# Patient Record
Sex: MALE | Race: WHITE | NOT HISPANIC OR LATINO | Employment: FULL TIME | URBAN - NONMETROPOLITAN AREA
[De-identification: names, ages, dates, MRNs, and addresses within clinical notes are randomized per-mention and may not be internally consistent; named-entity substitution may affect disease eponyms.]

---

## 2017-09-08 ENCOUNTER — HOSPITAL ENCOUNTER (EMERGENCY)
Facility: HOSPITAL | Age: 22
Discharge: HOME | End: 2017-09-09
Attending: FAMILY MEDICINE | Admitting: EMERGENCY MEDICINE

## 2017-09-08 ENCOUNTER — APPOINTMENT (OUTPATIENT)
Dept: GENERAL RADIOLOGY | Facility: HOSPITAL | Age: 22
End: 2017-09-08

## 2017-09-08 DIAGNOSIS — R07.9 CHEST PAIN, UNSPECIFIED TYPE: Primary | ICD-10-CM

## 2017-09-08 LAB
ALBUMIN SERPL-MCNC: 4.6 G/DL (ref 3.5–5)
ALBUMIN/GLOB SERPL: 1.5 G/DL (ref 1.1–2.5)
ALP SERPL-CCNC: 113 U/L (ref 24–120)
ALT SERPL W P-5'-P-CCNC: 54 U/L (ref 0–54)
AMYLASE SERPL-CCNC: 66 U/L (ref 30–110)
ANION GAP SERPL CALCULATED.3IONS-SCNC: 11 MMOL/L (ref 4–13)
AST SERPL-CCNC: 44 U/L (ref 7–45)
BASOPHILS # BLD AUTO: 0.01 10*3/MM3 (ref 0–0.2)
BASOPHILS NFR BLD AUTO: 0.1 % (ref 0–2)
BILIRUB SERPL-MCNC: 1.4 MG/DL (ref 0.1–1)
BILIRUB UR QL STRIP: NEGATIVE
BUN BLD-MCNC: 18 MG/DL (ref 5–21)
BUN/CREAT SERPL: 18 (ref 7–25)
CALCIUM SPEC-SCNC: 9.2 MG/DL (ref 8.4–10.4)
CHLORIDE SERPL-SCNC: 103 MMOL/L (ref 98–110)
CLARITY UR: CLEAR
CO2 SERPL-SCNC: 26 MMOL/L (ref 24–31)
COLOR UR: YELLOW
CREAT BLD-MCNC: 1 MG/DL (ref 0.5–1.4)
DEPRECATED RDW RBC AUTO: 38.8 FL (ref 40–54)
EOSINOPHIL # BLD AUTO: 0.08 10*3/MM3 (ref 0–0.7)
EOSINOPHIL NFR BLD AUTO: 1 % (ref 0–4)
ERYTHROCYTE [DISTWIDTH] IN BLOOD BY AUTOMATED COUNT: 12.4 % (ref 12–15)
GFR SERPL CREATININE-BSD FRML MDRD: 94 ML/MIN/1.73
GLOBULIN UR ELPH-MCNC: 3 GM/DL
GLUCOSE BLD-MCNC: 104 MG/DL (ref 70–100)
GLUCOSE UR STRIP-MCNC: NEGATIVE MG/DL
HCT VFR BLD AUTO: 40.3 % (ref 40–52)
HGB BLD-MCNC: 14.2 G/DL (ref 14–18)
HGB UR QL STRIP.AUTO: NEGATIVE
HOLD SPECIMEN: NORMAL
HOLD SPECIMEN: NORMAL
IMM GRANULOCYTES # BLD: 0.01 10*3/MM3 (ref 0–0.03)
IMM GRANULOCYTES NFR BLD: 0.1 % (ref 0–5)
KETONES UR QL STRIP: NEGATIVE
LEUKOCYTE ESTERASE UR QL STRIP.AUTO: NEGATIVE
LIPASE SERPL-CCNC: 41 U/L (ref 23–203)
LYMPHOCYTES # BLD AUTO: 2.43 10*3/MM3 (ref 0.72–4.86)
LYMPHOCYTES NFR BLD AUTO: 31.2 % (ref 15–45)
MCH RBC QN AUTO: 30.4 PG (ref 28–32)
MCHC RBC AUTO-ENTMCNC: 35.2 G/DL (ref 33–36)
MCV RBC AUTO: 86.3 FL (ref 82–95)
MONOCYTES # BLD AUTO: 1.27 10*3/MM3 (ref 0.19–1.3)
MONOCYTES NFR BLD AUTO: 16.3 % (ref 4–12)
NEUTROPHILS # BLD AUTO: 4 10*3/MM3 (ref 1.87–8.4)
NEUTROPHILS NFR BLD AUTO: 51.3 % (ref 39–78)
NITRITE UR QL STRIP: NEGATIVE
PH UR STRIP.AUTO: 7.5 [PH] (ref 5–8)
PLATELET # BLD AUTO: 213 10*3/MM3 (ref 130–400)
PMV BLD AUTO: 10.5 FL (ref 6–12)
POTASSIUM BLD-SCNC: 3.9 MMOL/L (ref 3.5–5.3)
PROT SERPL-MCNC: 7.6 G/DL (ref 6.3–8.7)
PROT UR QL STRIP: NEGATIVE
RBC # BLD AUTO: 4.67 10*6/MM3 (ref 4.8–5.9)
SODIUM BLD-SCNC: 140 MMOL/L (ref 135–145)
SP GR UR STRIP: 1.01 (ref 1–1.03)
TROPONIN I SERPL-MCNC: <0.012 NG/ML (ref 0–0.03)
UROBILINOGEN UR QL STRIP: NORMAL
WBC NRBC COR # BLD: 7.8 10*3/MM3 (ref 4.8–10.8)
WHOLE BLOOD HOLD SPECIMEN: NORMAL
WHOLE BLOOD HOLD SPECIMEN: NORMAL

## 2017-09-08 PROCEDURE — 80053 COMPREHEN METABOLIC PANEL: CPT | Performed by: FAMILY MEDICINE

## 2017-09-08 PROCEDURE — 83690 ASSAY OF LIPASE: CPT | Performed by: FAMILY MEDICINE

## 2017-09-08 PROCEDURE — 85025 COMPLETE CBC W/AUTO DIFF WBC: CPT | Performed by: FAMILY MEDICINE

## 2017-09-08 PROCEDURE — 93005 ELECTROCARDIOGRAM TRACING: CPT

## 2017-09-08 PROCEDURE — 84484 ASSAY OF TROPONIN QUANT: CPT | Performed by: FAMILY MEDICINE

## 2017-09-08 PROCEDURE — 99284 EMERGENCY DEPT VISIT MOD MDM: CPT

## 2017-09-08 PROCEDURE — 81003 URINALYSIS AUTO W/O SCOPE: CPT | Performed by: FAMILY MEDICINE

## 2017-09-08 PROCEDURE — 82150 ASSAY OF AMYLASE: CPT | Performed by: FAMILY MEDICINE

## 2017-09-08 PROCEDURE — 93010 ELECTROCARDIOGRAM REPORT: CPT | Performed by: INTERNAL MEDICINE

## 2017-09-08 PROCEDURE — 71010 HC CHEST PA OR AP: CPT

## 2017-09-08 RX ORDER — ATORVASTATIN CALCIUM 20 MG/1
20 TABLET, FILM COATED ORAL DAILY
COMMUNITY
End: 2019-10-12

## 2017-09-08 RX ORDER — SODIUM CHLORIDE 0.9 % (FLUSH) 0.9 %
10 SYRINGE (ML) INJECTION AS NEEDED
Status: DISCONTINUED | OUTPATIENT
Start: 2017-09-08 | End: 2017-09-09 | Stop reason: HOSPADM

## 2017-09-08 RX ORDER — ALUMINA, MAGNESIA, AND SIMETHICONE 2400; 2400; 240 MG/30ML; MG/30ML; MG/30ML
20 SUSPENSION ORAL ONCE
Status: COMPLETED | OUTPATIENT
Start: 2017-09-08 | End: 2017-09-08

## 2017-09-08 RX ADMIN — ALUMINUM HYDROXIDE, MAGNESIUM HYDROXIDE, AND DIMETHICONE 20 ML: 400; 400; 40 SUSPENSION ORAL at 23:10

## 2017-09-08 RX ADMIN — LIDOCAINE HYDROCHLORIDE 15 ML: 20 SOLUTION ORAL; TOPICAL at 23:11

## 2017-09-09 VITALS
HEART RATE: 79 BPM | DIASTOLIC BLOOD PRESSURE: 71 MMHG | TEMPERATURE: 97.8 F | WEIGHT: 185 LBS | RESPIRATION RATE: 16 BRPM | SYSTOLIC BLOOD PRESSURE: 141 MMHG | OXYGEN SATURATION: 98 % | HEIGHT: 70 IN | BODY MASS INDEX: 26.48 KG/M2

## 2017-09-09 LAB — TROPONIN I SERPL-MCNC: <0.012 NG/ML (ref 0–0.03)

## 2017-09-09 PROCEDURE — 93010 ELECTROCARDIOGRAM REPORT: CPT | Performed by: INTERNAL MEDICINE

## 2017-09-09 PROCEDURE — 93005 ELECTROCARDIOGRAM TRACING: CPT | Performed by: FAMILY MEDICINE

## 2017-09-09 PROCEDURE — 84484 ASSAY OF TROPONIN QUANT: CPT | Performed by: FAMILY MEDICINE

## 2017-09-09 NOTE — ED NOTES
After detailed reevaluation and lengthy discussion md with pt and family, new orders received. Pt and family updated     Demi Rich RN  09/08/17 1785

## 2017-09-09 NOTE — ED NOTES
Pt to triage window demanding aspirin, informed pt I could not give him any meds until he was seen by the md Fide Gaston, RN  09/08/17 2052

## 2017-09-09 NOTE — DISCHARGE INSTRUCTIONS

## 2017-09-09 NOTE — ED PROVIDER NOTES
Subjective   HPI Comments: Patient presents today for chest pain.  Patient reports that approximately 45 minutes ago he began to have chest pain in the center of his chest that radiated up into his left shoulder and down his left arm.  He had no treatment prior to arrival.  Nothing made this worse nothing made this better.  Patient's last meal was approximately hour prior to this he had a steak and baked potato.      History provided by:  Patient   used: No        Review of Systems   Constitutional: Negative for activity change, chills, fatigue and fever.   HENT: Negative for congestion and dental problem.    Eyes: Negative for pain, discharge and itching.   Respiratory: Negative for apnea, cough, choking, chest tightness, shortness of breath, wheezing and stridor.    Cardiovascular: Positive for chest pain. Negative for palpitations.   Gastrointestinal: Negative for abdominal pain, diarrhea, nausea and vomiting.   Endocrine: Negative for polydipsia and polyuria.   Genitourinary: Negative for difficulty urinating and dysuria.   Musculoskeletal: Negative for arthralgias, neck pain and neck stiffness.   Skin: Negative for color change, pallor, rash and wound.   Neurological: Negative for dizziness and headaches.   Hematological: Negative for adenopathy. Does not bruise/bleed easily.   Psychiatric/Behavioral: Negative for agitation and behavioral problems.       Past Medical History:   Diagnosis Date   • Epistaxis    • High cholesterol    • URI (upper respiratory infection)        No Known Allergies    History reviewed. No pertinent surgical history.    History reviewed. No pertinent family history.    Social History     Social History   • Marital status: Single     Spouse name: N/A   • Number of children: N/A   • Years of education: N/A     Social History Main Topics   • Smoking status: Never Smoker   • Smokeless tobacco: None   • Alcohol use Yes   • Drug use: None   • Sexual activity: Not Asked  "    Other Topics Concern   • None     Social History Narrative   • None       Lab Results (last 24 hours)     ** No results found for the last 24 hours. **          Objective   Physical Exam   Constitutional: He is oriented to person, place, and time. He appears well-developed and well-nourished. No distress.   HENT:   Head: Normocephalic and atraumatic.   Eyes: Conjunctivae and EOM are normal. Pupils are equal, round, and reactive to light. Right eye exhibits no discharge. Left eye exhibits no discharge. No scleral icterus.   Neck: Normal range of motion. Neck supple. No JVD present. No tracheal deviation present. No thyromegaly present.   Cardiovascular: Normal rate and regular rhythm.  Exam reveals no gallop and no friction rub.    No murmur heard.  Pulmonary/Chest: Effort normal and breath sounds normal. No respiratory distress. He has no wheezes. He has no rales. He exhibits no tenderness.   Abdominal: Soft. Bowel sounds are normal. He exhibits no distension.   Musculoskeletal: Normal range of motion. He exhibits no edema, tenderness or deformity.   Neurological: He is alert and oriented to person, place, and time. No cranial nerve deficit. Coordination normal.   Skin: Skin is warm and dry. He is not diaphoretic.   Nursing note and vitals reviewed.      Procedures         XR Chest 1 View    (Results Pending)       /92 (BP Location: Right arm, Patient Position: Sitting)  Pulse 72  Temp 97.8 °F (36.6 °C) (Temporal Artery )   Resp 16  Ht 70\" (177.8 cm)  Wt 185 lb (83.9 kg)  SpO2 100%  BMI 26.54 kg/m2    ED Course    ED Course   Comment By Time   Went in and discussed with patient's mom who is just arrived.  The patient's mother got the patient to admit that he was actually still having chest pain at this time.  He states that it is still there it is probably 5 out of 10.  He states it is no longer radiating up into his shoulder however but it is still in fact there.  Given this we will be doing a " second set of cardiac enzymes and EKG and set of discharging the patient home as first intended.  I am also going to give the patient a GI cocktail to see if we elicit any type of relief from that.  Patient will be handed over to the care of Dr. Miles.  He will finish out the chart. Bella Guidry, DO 09/08 2258       Medications   sodium chloride 0.9 % flush 10 mL (not administered)            MDM  Number of Diagnoses or Management Options  Chest pain, unspecified type: new and requires workup     Amount and/or Complexity of Data Reviewed  Clinical lab tests: ordered and reviewed  Tests in the radiology section of CPT®: ordered and reviewed  Tests in the medicine section of CPT®: ordered and reviewed  Decide to obtain previous medical records or to obtain history from someone other than the patient: yes  Review and summarize past medical records: yes  Independent visualization of images, tracings, or specimens: yes    Risk of Complications, Morbidity, and/or Mortality  Presenting problems: moderate  Diagnostic procedures: moderate  Management options: moderate    Patient Progress  Patient progress: stable    [place] findings with the patient and his wife.  Patient still chest pain and asymptomatic at this time.  Patient will follow-up with his regular doctor.  Patient is elected to leave rather than stay for extra enzymes.  Final diagnoses:   Chest pain, unspecified type          Bella Guidry,   09/08/17 2248       Bella Guidry,   09/08/17 2252

## 2017-09-09 NOTE — ED PROVIDER NOTES
"Subjective   HPI Comments: Patient was initially seen by Dr. Guidry and was transferred to this examiner (Dr Matias) as her shift was completed  Patient is a 22-year-old male with history of hypercholesterolemia and acid reflux who reports that about 8:30 PM on Friday (09/08/2017) while driving he began having \"pressure\" substernal chest pain that briefly radiated to both shoulder areas.  He reports he had no shortness of breath, nausea, vomiting or diaphoresis.  He reports his chest pain was worsened by nothing and was relieved better by a GI cocktail given in this emergency department.  He reports his chest pain currently is a 0 (out of 10) and at its worse was an 8 (out of 10).  He reports he takes Zantac every day for his acid reflux.  He reports that the substernal chest pain that he was having felt different than his acid reflux.      History provided by:  Patient      Review of Systems   Constitutional: Negative.    HENT: Negative.    Eyes: Negative.    Respiratory: Negative.    Cardiovascular: Positive for chest pain.   Gastrointestinal:        Acid reflux   Endocrine: Negative.    Genitourinary: Negative.    Musculoskeletal: Negative.    Skin: Negative.    Allergic/Immunologic: Negative.    Neurological: Negative.    Hematological: Negative.    Psychiatric/Behavioral: Negative.    All other systems reviewed and are negative.      Past Medical History:   Diagnosis Date   • Epistaxis    • High cholesterol    • URI (upper respiratory infection)        No Known Allergies    History reviewed. No pertinent surgical history.    History reviewed. No pertinent family history.    Social History     Social History   • Marital status: Single     Spouse name: N/A   • Number of children: N/A   • Years of education: N/A     Social History Main Topics   • Smoking status: Never Smoker   • Smokeless tobacco: None   • Alcohol use Yes   • Drug use: None   • Sexual activity: Not Asked     Other Topics Concern   • None     Social " History Narrative   • None           Objective   Physical Exam   Constitutional: He is oriented to person, place, and time. He appears well-developed and well-nourished.   HENT:   Head: Normocephalic and atraumatic.   Right Ear: External ear normal.   Left Ear: External ear normal.   Nose: Nose normal.   Mouth/Throat: Oropharynx is clear and moist.   Eyes: Conjunctivae and EOM are normal. Pupils are equal, round, and reactive to light. Right eye exhibits no discharge. Left eye exhibits no discharge.   Neck: Normal range of motion. Neck supple. No tracheal deviation present. No thyromegaly present.   Cardiovascular: Normal rate, regular rhythm, normal heart sounds and intact distal pulses.    No murmur heard.  Pulmonary/Chest: Effort normal and breath sounds normal. No respiratory distress. He exhibits no tenderness.   Abdominal: Soft. He exhibits no distension. There is no tenderness.   Musculoskeletal: Normal range of motion. He exhibits no edema, tenderness or deformity.   Neurological: He is alert and oriented to person, place, and time. No cranial nerve deficit.   Skin: Skin is warm and dry. No erythema. No pallor.   Psychiatric: He has a normal mood and affect. Judgment normal.   Nursing note and vitals reviewed.      Procedures         ED Course  ED Course   Comment By Time   Went in and discussed with patient's mom who is just arrived.  The patient's mother got the patient to admit that he was actually still having chest pain at this time.  He states that it is still there it is probably 5 out of 10.  He states it is no longer radiating up into his shoulder however but it is still in fact there.  Given this we will be doing a second set of cardiac enzymes and EKG and set of discharging the patient home as first intended.  I am also going to give the patient a GI cocktail to see if we elicit any type of relief from that.  Patient will be handed over to the care of Dr. Miles.  He will finish out the chart.  Bella Guidry, DO 09/08 3299   Chest x-ray demonstrates no acute cardiopulmonary process. Louis Matias MD 09/09 0129                  MDM  Number of Diagnoses or Management Options  Chest pain, unspecified type: new and requires workup     Amount and/or Complexity of Data Reviewed  Clinical lab tests: reviewed  Tests in the radiology section of CPT®: reviewed  Discuss the patient with other providers: yes    Risk of Complications, Morbidity, and/or Mortality  Presenting problems: moderate  Diagnostic procedures: moderate  Management options: low    Patient Progress  Patient progress: stable      Final diagnoses:   Chest pain, unspecified type            Louis Matias MD  09/09/17 0131

## 2018-02-03 ENCOUNTER — HOSPITAL ENCOUNTER (EMERGENCY)
Facility: HOSPITAL | Age: 23
Discharge: HOME | End: 2018-02-03
Admitting: EMERGENCY MEDICINE

## 2018-02-03 ENCOUNTER — APPOINTMENT (OUTPATIENT)
Dept: ULTRASOUND IMAGING | Facility: HOSPITAL | Age: 23
End: 2018-02-03

## 2018-02-03 VITALS
SYSTOLIC BLOOD PRESSURE: 134 MMHG | OXYGEN SATURATION: 100 % | BODY MASS INDEX: 27.2 KG/M2 | WEIGHT: 190 LBS | RESPIRATION RATE: 16 BRPM | DIASTOLIC BLOOD PRESSURE: 73 MMHG | HEART RATE: 54 BPM | HEIGHT: 70 IN | TEMPERATURE: 97.9 F

## 2018-02-03 DIAGNOSIS — K40.90 LEFT INGUINAL HERNIA: Primary | ICD-10-CM

## 2018-02-03 DIAGNOSIS — R31.9 HEMATURIA, UNSPECIFIED TYPE: ICD-10-CM

## 2018-02-03 LAB
ALBUMIN SERPL-MCNC: 4.4 G/DL (ref 3.5–5)
ALBUMIN/GLOB SERPL: 1.4 G/DL (ref 1.1–2.5)
ALP SERPL-CCNC: 98 U/L (ref 24–120)
ALT SERPL W P-5'-P-CCNC: 44 U/L (ref 0–54)
ANION GAP SERPL CALCULATED.3IONS-SCNC: 13 MMOL/L (ref 4–13)
AST SERPL-CCNC: 30 U/L (ref 7–45)
BACTERIA UR QL AUTO: ABNORMAL /HPF
BASOPHILS # BLD AUTO: 0.04 10*3/MM3 (ref 0–0.2)
BASOPHILS NFR BLD AUTO: 0.7 % (ref 0–2)
BILIRUB SERPL-MCNC: 1 MG/DL (ref 0.1–1)
BILIRUB UR QL STRIP: NEGATIVE
BUN BLD-MCNC: 18 MG/DL (ref 5–21)
BUN/CREAT SERPL: 16.2 (ref 7–25)
CALCIUM SPEC-SCNC: 9.4 MG/DL (ref 8.4–10.4)
CHLORIDE SERPL-SCNC: 103 MMOL/L (ref 98–110)
CLARITY UR: CLEAR
CO2 SERPL-SCNC: 27 MMOL/L (ref 24–31)
COLOR UR: YELLOW
CREAT BLD-MCNC: 1.11 MG/DL (ref 0.5–1.4)
DEPRECATED RDW RBC AUTO: 36.4 FL (ref 40–54)
EOSINOPHIL # BLD AUTO: 0.06 10*3/MM3 (ref 0–0.7)
EOSINOPHIL NFR BLD AUTO: 1.1 % (ref 0–4)
ERYTHROCYTE [DISTWIDTH] IN BLOOD BY AUTOMATED COUNT: 12 % (ref 12–15)
GFR SERPL CREATININE-BSD FRML MDRD: 84 ML/MIN/1.73
GLOBULIN UR ELPH-MCNC: 3.2 GM/DL
GLUCOSE BLD-MCNC: 94 MG/DL (ref 70–100)
GLUCOSE UR STRIP-MCNC: NEGATIVE MG/DL
HCT VFR BLD AUTO: 42.8 % (ref 40–52)
HGB BLD-MCNC: 15 G/DL (ref 14–18)
HGB UR QL STRIP.AUTO: ABNORMAL
HOLD SPECIMEN: NORMAL
HOLD SPECIMEN: NORMAL
HYALINE CASTS UR QL AUTO: ABNORMAL /LPF
IMM GRANULOCYTES # BLD: 0.02 10*3/MM3 (ref 0–0.03)
IMM GRANULOCYTES NFR BLD: 0.4 % (ref 0–5)
KETONES UR QL STRIP: NEGATIVE
LEUKOCYTE ESTERASE UR QL STRIP.AUTO: NEGATIVE
LYMPHOCYTES # BLD AUTO: 1.82 10*3/MM3 (ref 0.72–4.86)
LYMPHOCYTES NFR BLD AUTO: 34 % (ref 15–45)
MCH RBC QN AUTO: 29.8 PG (ref 28–32)
MCHC RBC AUTO-ENTMCNC: 35 G/DL (ref 33–36)
MCV RBC AUTO: 84.9 FL (ref 82–95)
MONOCYTES # BLD AUTO: 0.68 10*3/MM3 (ref 0.19–1.3)
MONOCYTES NFR BLD AUTO: 12.7 % (ref 4–12)
NEUTROPHILS # BLD AUTO: 2.73 10*3/MM3 (ref 1.87–8.4)
NEUTROPHILS NFR BLD AUTO: 51.1 % (ref 39–78)
NITRITE UR QL STRIP: NEGATIVE
NRBC BLD MANUAL-RTO: 0 /100 WBC (ref 0–0)
PH UR STRIP.AUTO: 5.5 [PH] (ref 5–8)
PLATELET # BLD AUTO: 279 10*3/MM3 (ref 130–400)
PMV BLD AUTO: 9.8 FL (ref 6–12)
POTASSIUM BLD-SCNC: 4.2 MMOL/L (ref 3.5–5.3)
PROT SERPL-MCNC: 7.6 G/DL (ref 6.3–8.7)
PROT UR QL STRIP: NEGATIVE
RBC # BLD AUTO: 5.04 10*6/MM3 (ref 4.8–5.9)
RBC # UR: ABNORMAL /HPF
REF LAB TEST METHOD: ABNORMAL
SODIUM BLD-SCNC: 143 MMOL/L (ref 135–145)
SP GR UR STRIP: 1.02 (ref 1–1.03)
SQUAMOUS #/AREA URNS HPF: ABNORMAL /HPF
UROBILINOGEN UR QL STRIP: ABNORMAL
WBC NRBC COR # BLD: 5.35 10*3/MM3 (ref 4.8–10.8)
WBC UR QL AUTO: ABNORMAL /HPF
WHOLE BLOOD HOLD SPECIMEN: NORMAL
WHOLE BLOOD HOLD SPECIMEN: NORMAL

## 2018-02-03 PROCEDURE — 81001 URINALYSIS AUTO W/SCOPE: CPT | Performed by: NURSE PRACTITIONER

## 2018-02-03 PROCEDURE — 99283 EMERGENCY DEPT VISIT LOW MDM: CPT

## 2018-02-03 PROCEDURE — 85025 COMPLETE CBC W/AUTO DIFF WBC: CPT | Performed by: NURSE PRACTITIONER

## 2018-02-03 PROCEDURE — 96374 THER/PROPH/DIAG INJ IV PUSH: CPT

## 2018-02-03 PROCEDURE — 80053 COMPREHEN METABOLIC PANEL: CPT | Performed by: NURSE PRACTITIONER

## 2018-02-03 PROCEDURE — 76870 US EXAM SCROTUM: CPT

## 2018-02-03 PROCEDURE — 96375 TX/PRO/DX INJ NEW DRUG ADDON: CPT

## 2018-02-03 PROCEDURE — 25010000002 MORPHINE PER 10 MG: Performed by: NURSE PRACTITIONER

## 2018-02-03 PROCEDURE — 25010000002 ONDANSETRON PER 1 MG: Performed by: NURSE PRACTITIONER

## 2018-02-03 RX ORDER — SODIUM CHLORIDE 0.9 % (FLUSH) 0.9 %
10 SYRINGE (ML) INJECTION AS NEEDED
Status: DISCONTINUED | OUTPATIENT
Start: 2018-02-03 | End: 2018-02-03 | Stop reason: HOSPADM

## 2018-02-03 RX ORDER — HYDROCODONE BITARTRATE AND ACETAMINOPHEN 5; 325 MG/1; MG/1
1 TABLET ORAL EVERY 6 HOURS PRN
Qty: 15 TABLET | Refills: 0 | Status: SHIPPED | OUTPATIENT
Start: 2018-02-03 | End: 2018-04-10

## 2018-02-03 RX ORDER — RANITIDINE 150 MG/1
150 TABLET ORAL 2 TIMES DAILY
COMMUNITY
End: 2019-10-12

## 2018-02-03 RX ORDER — ONDANSETRON 2 MG/ML
4 INJECTION INTRAMUSCULAR; INTRAVENOUS ONCE
Status: COMPLETED | OUTPATIENT
Start: 2018-02-03 | End: 2018-02-03

## 2018-02-03 RX ORDER — MORPHINE SULFATE 4 MG/ML
4 INJECTION, SOLUTION INTRAMUSCULAR; INTRAVENOUS ONCE
Status: COMPLETED | OUTPATIENT
Start: 2018-02-03 | End: 2018-02-03

## 2018-02-03 RX ADMIN — ONDANSETRON 4 MG: 2 INJECTION, SOLUTION INTRAMUSCULAR; INTRAVENOUS at 08:35

## 2018-02-03 RX ADMIN — MORPHINE SULFATE 4 MG: 4 INJECTION, SOLUTION INTRAMUSCULAR; INTRAVENOUS at 08:36

## 2018-02-03 NOTE — ED PROVIDER NOTES
Subjective   HPI Comments: This 22-year-old white male presents with left testicular pain that began around 5 o'clock this morning.  He states the pain was sudden and walking from his sleep.  He states he has urinated without difficulty.  He denies any fever or chills.  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  He denies having any pain yesterday and denies any injury.  His family also states that he played multiple softball games last week as well the patient was not having any pain until this morning.  Denies any penile discharge or dysuria.    Patient is a 22 y.o. male presenting with male genitourinary complaint.   History provided by:  Patient   used: No    Male  Problem       Review of Systems   Constitutional: Negative.    HENT: Negative.    Eyes: Negative.    Respiratory: Negative.    Cardiovascular: Negative.    Gastrointestinal: Negative.    Endocrine: Negative.    Genitourinary:        This 22-year-old white male presents with left testicular pain that began around 5 o'clock this morning.  He states the pain was sudden and walking from his sleep.  He states he has urinated without difficulty.  He denies any fever or chills.  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  He denies having any pain yesterday and denies any injury.  His family also states that he played multiple softball games last week as well the patient was not having any pain until this morning.  Denies any penile discharge or dysuria.     Musculoskeletal: Negative.    Skin: Negative.    Allergic/Immunologic: Negative.    Neurological: Negative.    Hematological: Negative.    Psychiatric/Behavioral: Negative.    All other systems reviewed and are negative.      Past Medical History:   Diagnosis Date   • Epistaxis    • High cholesterol    • URI (upper respiratory infection)        No Known Allergies    History reviewed. No pertinent  "surgical history.    History reviewed. No pertinent family history.    Social History     Social History   • Marital status: Single     Spouse name: N/A   • Number of children: N/A   • Years of education: N/A     Social History Main Topics   • Smoking status: Never Smoker   • Smokeless tobacco: None   • Alcohol use Yes      Comment: Socially   • Drug use: No   • Sexual activity: Defer     Other Topics Concern   • None     Social History Narrative   • None       Prior to Admission medications    Medication Sig Start Date End Date Taking? Authorizing Provider   raNITIdine (ZANTAC) 150 MG tablet Take 150 mg by mouth 2 (Two) Times a Day.   Yes Historical Provider, MD   atorvastatin (LIPITOR) 20 MG tablet Take 20 mg by mouth Daily.    Historical Provider, MD       /73  Pulse 54  Temp 97.9 °F (36.6 °C)  Resp 16  Ht 177.8 cm (70\")  Wt 86.2 kg (190 lb)  SpO2 100%  BMI 27.26 kg/m2    Objective   Physical Exam   Constitutional: He is oriented to person, place, and time. He appears well-developed and well-nourished.   HENT:   Head: Normocephalic and atraumatic.   Eyes: Conjunctivae and EOM are normal. Pupils are equal, round, and reactive to light.   Neck: Normal range of motion. Neck supple.   Cardiovascular: Normal rate, regular rhythm, normal heart sounds and intact distal pulses.    Pulmonary/Chest: Effort normal and breath sounds normal.   Abdominal: Soft. Bowel sounds are normal.   Genitourinary:   Genitourinary Comments: Moderate tenderness and swelling noted to left scrotum. No eryth noted. No palpable nodule or mass noted.    Musculoskeletal: Normal range of motion.   Neurological: He is alert and oriented to person, place, and time. He has normal reflexes.   Skin: Skin is warm and dry.   Psychiatric: He has a normal mood and affect. His behavior is normal. Judgment and thought content normal.   Nursing note and vitals reviewed.      Procedures         Lab Results (last 24 hours)     Procedure Component " Value Units Date/Time    CBC & Differential [[id number]] Collected:  02/03/18 0800    Specimen:  Blood Updated:  02/03/18 0811    Narrative:       The following orders were created for panel order CBC & Differential.  Procedure                               Abnormality         Status                     ---------                               -----------         ------                     CBC Auto Differential[[id number]]        Abnormal            Final result                 Please view results for these tests on the individual orders.    Comprehensive Metabolic Panel [[id number]] Collected:  02/03/18 0800    Specimen:  Blood Updated:  02/03/18 0822     Glucose 94 mg/dL      BUN 18 mg/dL      Creatinine 1.11 mg/dL      Sodium 143 mmol/L      Potassium 4.2 mmol/L      Chloride 103 mmol/L      CO2 27.0 mmol/L      Calcium 9.4 mg/dL      Total Protein 7.6 g/dL      Albumin 4.40 g/dL      ALT (SGPT) 44 U/L      AST (SGOT) 30 U/L      Alkaline Phosphatase 98 U/L      Total Bilirubin 1.0 mg/dL      eGFR Non African Amer 84 mL/min/1.73      Globulin 3.2 gm/dL      A/G Ratio 1.4 g/dL      BUN/Creatinine Ratio 16.2     Anion Gap 13.0 mmol/L     CBC Auto Differential [[id number]]  (Abnormal) Collected:  02/03/18 0800    Specimen:  Blood Updated:  02/03/18 0811     WBC 5.35 10*3/mm3      RBC 5.04 10*6/mm3      Hemoglobin 15.0 g/dL      Hematocrit 42.8 %      MCV 84.9 fL      MCH 29.8 pg      MCHC 35.0 g/dL      RDW 12.0 %      RDW-SD 36.4 (L) fl      MPV 9.8 fL      Platelets 279 10*3/mm3      Neutrophil % 51.1 %      Lymphocyte % 34.0 %      Monocyte % 12.7 (H) %      Eosinophil % 1.1 %      Basophil % 0.7 %      Immature Grans % 0.4 %      Neutrophils, Absolute 2.73 10*3/mm3      Lymphocytes, Absolute 1.82 10*3/mm3      Monocytes, Absolute 0.68 10*3/mm3      Eosinophils, Absolute 0.06 10*3/mm3      Basophils, Absolute 0.04 10*3/mm3      Immature Grans, Absolute 0.02 10*3/mm3      nRBC 0.0 /100 WBC     Urinalysis With / Culture  If Indicated - Urine, Clean Catch [[id number]]  (Abnormal) Collected:  02/03/18 1015    Specimen:  Urine from Urine, Clean Catch Updated:  02/03/18 1029     Color, UA Yellow     Appearance, UA Clear     pH, UA 5.5     Specific Gravity, UA 1.019     Glucose, UA Negative     Ketones, UA Negative     Bilirubin, UA Negative     Blood, UA Large (3+) (A)     Protein, UA Negative     Leuk Esterase, UA Negative     Nitrite, UA Negative     Urobilinogen, UA 0.2 E.U./dL    Urinalysis, Microscopic Only - Urine, Clean Catch [[id number]]  (Abnormal) Collected:  02/03/18 1015    Specimen:  Urine from Urine, Clean Catch Updated:  02/03/18 1029     RBC, UA 13-20 (A) /HPF      WBC, UA 0-2 (A) /HPF      Bacteria, UA None Seen /HPF      Squamous Epithelial Cells, UA None Seen /HPF      Hyaline Casts, UA 3-6 /LPF      Methodology Automated Microscopy          US Scrotum & Testicles   ED Interpretation   Impression:       1. Normal appearance of the testicles without evidence of testicular   torsion.       2. Single image demonstrates soft tissue density in the region of the   left inguinal canal which cannot be reproduced. This may represent   artifact or bowel. Correlation for possible hernia recommended.   This report was finalized on 02/03/2018 09:41 by Dr. Markus Lockwood MD.      Final Result   Impression:       1. Normal appearance of the testicles without evidence of testicular   torsion.       2. Single image demonstrates soft tissue density in the region of the   left inguinal canal which cannot be reproduced. This may represent   artifact or bowel. Correlation for possible hernia recommended.   This report was finalized on 02/03/2018 09:41 by Dr. Markus Lockwood MD.          ED Course  ED Course   Comment By Time   Pending ultrasound at this time. Nano Weldon, APRN 02/03 1659   Reviewed pt and pt care plan with dr rodríguez- also in agreement with pt care plan. Reviewed results with pt and pt significant other. Have  advised to follow up with dr anderson for further. Advised to limit lifting as well. [facility] report completed #[id number]. No signs of suspicious activity noted. Will write for small amt of pain medication for acute pain. Reviewed side effects and potential for abuse. Nano Weldon, APRN 02/03 1031          MDM  Number of Diagnoses or Management Options  Hematuria, unspecified type: new and requires workup  Left inguinal hernia: new and requires workup     Amount and/or Complexity of Data Reviewed  Clinical lab tests: ordered and reviewed  Tests in the radiology section of CPT®: ordered and reviewed  Independent visualization of images, tracings, or specimens: yes        Final diagnoses:   Left inguinal hernia   Hematuria, unspecified type          Nano Weldon, APRN  02/03/18 1147

## 2018-02-03 NOTE — DISCHARGE INSTRUCTIONS
Return to ER if symptoms worsen   Ice to area  No lifting over 5 pounds until released per dr anderson; light duty only

## 2018-02-04 ENCOUNTER — APPOINTMENT (OUTPATIENT)
Dept: CT IMAGING | Facility: HOSPITAL | Age: 23
End: 2018-02-04

## 2018-02-04 ENCOUNTER — HOSPITAL ENCOUNTER (EMERGENCY)
Facility: HOSPITAL | Age: 23
Discharge: HOME | End: 2018-02-04
Admitting: NURSE PRACTITIONER

## 2018-02-04 VITALS
OXYGEN SATURATION: 100 % | SYSTOLIC BLOOD PRESSURE: 141 MMHG | RESPIRATION RATE: 18 BRPM | BODY MASS INDEX: 27.2 KG/M2 | WEIGHT: 190 LBS | TEMPERATURE: 98.1 F | HEART RATE: 53 BPM | HEIGHT: 70 IN | DIASTOLIC BLOOD PRESSURE: 68 MMHG

## 2018-02-04 DIAGNOSIS — K40.90 UNILATERAL INGUINAL HERNIA WITHOUT OBSTRUCTION OR GANGRENE, RECURRENCE NOT SPECIFIED: ICD-10-CM

## 2018-02-04 DIAGNOSIS — N20.0 KIDNEY STONE: Primary | ICD-10-CM

## 2018-02-04 LAB
ALBUMIN SERPL-MCNC: 4.6 G/DL (ref 3.5–5)
ALBUMIN/GLOB SERPL: 1.4 G/DL (ref 1.1–2.5)
ALP SERPL-CCNC: 100 U/L (ref 24–120)
ALT SERPL W P-5'-P-CCNC: 51 U/L (ref 0–54)
AMYLASE SERPL-CCNC: 66 U/L (ref 30–110)
ANION GAP SERPL CALCULATED.3IONS-SCNC: 12 MMOL/L (ref 4–13)
AST SERPL-CCNC: 32 U/L (ref 7–45)
BASOPHILS # BLD AUTO: 0.03 10*3/MM3 (ref 0–0.2)
BASOPHILS NFR BLD AUTO: 0.3 % (ref 0–2)
BILIRUB SERPL-MCNC: 1.4 MG/DL (ref 0.1–1)
BUN BLD-MCNC: 18 MG/DL (ref 5–21)
BUN/CREAT SERPL: 17.6 (ref 7–25)
CALCIUM SPEC-SCNC: 9.6 MG/DL (ref 8.4–10.4)
CHLORIDE SERPL-SCNC: 102 MMOL/L (ref 98–110)
CO2 SERPL-SCNC: 28 MMOL/L (ref 24–31)
CREAT BLD-MCNC: 1.02 MG/DL (ref 0.5–1.4)
DEPRECATED RDW RBC AUTO: 37.7 FL (ref 40–54)
EOSINOPHIL # BLD AUTO: 0.01 10*3/MM3 (ref 0–0.7)
EOSINOPHIL NFR BLD AUTO: 0.1 % (ref 0–4)
ERYTHROCYTE [DISTWIDTH] IN BLOOD BY AUTOMATED COUNT: 12.1 % (ref 12–15)
GFR SERPL CREATININE-BSD FRML MDRD: 92 ML/MIN/1.73
GLOBULIN UR ELPH-MCNC: 3.3 GM/DL
GLUCOSE BLD-MCNC: 90 MG/DL (ref 70–100)
HCT VFR BLD AUTO: 42.9 % (ref 40–52)
HGB BLD-MCNC: 14.5 G/DL (ref 14–18)
HOLD SPECIMEN: NORMAL
HOLD SPECIMEN: NORMAL
IMM GRANULOCYTES # BLD: 0.02 10*3/MM3 (ref 0–0.03)
IMM GRANULOCYTES NFR BLD: 0.2 % (ref 0–5)
LIPASE SERPL-CCNC: 29 U/L (ref 23–203)
LYMPHOCYTES # BLD AUTO: 1.24 10*3/MM3 (ref 0.72–4.86)
LYMPHOCYTES NFR BLD AUTO: 13.2 % (ref 15–45)
MCH RBC QN AUTO: 29.1 PG (ref 28–32)
MCHC RBC AUTO-ENTMCNC: 33.8 G/DL (ref 33–36)
MCV RBC AUTO: 86 FL (ref 82–95)
MONOCYTES # BLD AUTO: 0.63 10*3/MM3 (ref 0.19–1.3)
MONOCYTES NFR BLD AUTO: 6.7 % (ref 4–12)
NEUTROPHILS # BLD AUTO: 7.43 10*3/MM3 (ref 1.87–8.4)
NEUTROPHILS NFR BLD AUTO: 79.5 % (ref 39–78)
NRBC BLD MANUAL-RTO: 0 /100 WBC (ref 0–0)
PLATELET # BLD AUTO: 284 10*3/MM3 (ref 130–400)
PMV BLD AUTO: 10.3 FL (ref 6–12)
POTASSIUM BLD-SCNC: 4.3 MMOL/L (ref 3.5–5.3)
PROT SERPL-MCNC: 7.9 G/DL (ref 6.3–8.7)
RBC # BLD AUTO: 4.99 10*6/MM3 (ref 4.8–5.9)
SODIUM BLD-SCNC: 142 MMOL/L (ref 135–145)
WBC NRBC COR # BLD: 9.36 10*3/MM3 (ref 4.8–10.8)
WHOLE BLOOD HOLD SPECIMEN: NORMAL
WHOLE BLOOD HOLD SPECIMEN: NORMAL

## 2018-02-04 PROCEDURE — 85025 COMPLETE CBC W/AUTO DIFF WBC: CPT | Performed by: NURSE PRACTITIONER

## 2018-02-04 PROCEDURE — 25010000002 KETOROLAC TROMETHAMINE PER 15 MG: Performed by: NURSE PRACTITIONER

## 2018-02-04 PROCEDURE — 80053 COMPREHEN METABOLIC PANEL: CPT | Performed by: NURSE PRACTITIONER

## 2018-02-04 PROCEDURE — 0 IOPAMIDOL 61 % SOLUTION: Performed by: NURSE PRACTITIONER

## 2018-02-04 PROCEDURE — 96375 TX/PRO/DX INJ NEW DRUG ADDON: CPT

## 2018-02-04 PROCEDURE — 99283 EMERGENCY DEPT VISIT LOW MDM: CPT

## 2018-02-04 PROCEDURE — 83690 ASSAY OF LIPASE: CPT | Performed by: NURSE PRACTITIONER

## 2018-02-04 PROCEDURE — 25010000002 HYDROMORPHONE PER 4 MG: Performed by: NURSE PRACTITIONER

## 2018-02-04 PROCEDURE — 74177 CT ABD & PELVIS W/CONTRAST: CPT

## 2018-02-04 PROCEDURE — 96361 HYDRATE IV INFUSION ADD-ON: CPT

## 2018-02-04 PROCEDURE — 96374 THER/PROPH/DIAG INJ IV PUSH: CPT

## 2018-02-04 PROCEDURE — 82150 ASSAY OF AMYLASE: CPT | Performed by: NURSE PRACTITIONER

## 2018-02-04 PROCEDURE — 25010000002 ONDANSETRON PER 1 MG: Performed by: NURSE PRACTITIONER

## 2018-02-04 RX ORDER — TAMSULOSIN HYDROCHLORIDE 0.4 MG/1
1 CAPSULE ORAL DAILY
Qty: 30 CAPSULE | Refills: 0 | Status: SHIPPED | OUTPATIENT
Start: 2018-02-04 | End: 2019-10-12

## 2018-02-04 RX ORDER — KETOROLAC TROMETHAMINE 30 MG/ML
30 INJECTION, SOLUTION INTRAMUSCULAR; INTRAVENOUS ONCE
Status: COMPLETED | OUTPATIENT
Start: 2018-02-04 | End: 2018-02-04

## 2018-02-04 RX ORDER — ONDANSETRON 2 MG/ML
4 INJECTION INTRAMUSCULAR; INTRAVENOUS ONCE
Status: COMPLETED | OUTPATIENT
Start: 2018-02-04 | End: 2018-02-04

## 2018-02-04 RX ORDER — OXYCODONE AND ACETAMINOPHEN 7.5; 325 MG/1; MG/1
1 TABLET ORAL EVERY 4 HOURS PRN
Qty: 15 TABLET | Refills: 0 | Status: SHIPPED | OUTPATIENT
Start: 2018-02-04 | End: 2018-04-10

## 2018-02-04 RX ORDER — SODIUM CHLORIDE 9 MG/ML
125 INJECTION, SOLUTION INTRAVENOUS CONTINUOUS
Status: DISCONTINUED | OUTPATIENT
Start: 2018-02-04 | End: 2018-02-04 | Stop reason: HOSPADM

## 2018-02-04 RX ORDER — ONDANSETRON 4 MG/1
4 TABLET, ORALLY DISINTEGRATING ORAL EVERY 6 HOURS PRN
Qty: 10 TABLET | Refills: 0 | Status: SHIPPED | OUTPATIENT
Start: 2018-02-04 | End: 2018-04-10

## 2018-02-04 RX ADMIN — KETOROLAC TROMETHAMINE 30 MG: 30 INJECTION, SOLUTION INTRAMUSCULAR at 15:17

## 2018-02-04 RX ADMIN — SODIUM CHLORIDE 125 ML/HR: 9 INJECTION, SOLUTION INTRAVENOUS at 14:17

## 2018-02-04 RX ADMIN — IOPAMIDOL 100 ML: 612 INJECTION, SOLUTION INTRAVENOUS at 14:43

## 2018-02-04 RX ADMIN — SODIUM CHLORIDE 1000 ML: 9 INJECTION, SOLUTION INTRAVENOUS at 15:21

## 2018-02-04 RX ADMIN — HYDROMORPHONE HYDROCHLORIDE 1 MG: 1 INJECTION, SOLUTION INTRAMUSCULAR; INTRAVENOUS; SUBCUTANEOUS at 14:21

## 2018-02-04 RX ADMIN — ONDANSETRON 4 MG: 2 INJECTION, SOLUTION INTRAMUSCULAR; INTRAVENOUS at 14:22

## 2018-02-04 NOTE — ED PROVIDER NOTES
Subjective   HPI Comments: Patient is a 22-year-old white male presents with left groin pain and left lower quadrant pain which has been going on for the past several days.  Patient was seen here yesterday with left testicular pain and had ultrasound and labs completed through the emergency department which indicated that he had a possible left inguinal hernia.  He was written a prescription for hydrocodone and advised that he would need to follow up with general surgery.  He states today that the pain is worse and that he has not had a bowel movement today as well.  He is complaining of nausea without vomiting.  He denies any fever or chills.    Patient is a 22 y.o. male presenting with abdominal pain.   History provided by:  Patient   used: No    Abdominal Pain       Review of Systems   Constitutional: Negative.    HENT: Negative.    Eyes: Negative.    Respiratory: Negative.    Cardiovascular: Negative.    Gastrointestinal: Positive for abdominal pain.        Patient is a 22-year-old white male presents with left groin pain and left lower quadrant pain which has been going on for the past several days.  Patient was seen here yesterday with left testicular pain and had ultrasound and labs completed through the emergency department which indicated that he had a possible left inguinal hernia.  He was written a prescription for hydrocodone and advised that he would need to follow up with general surgery.  He states today that the pain is worse and that he has not had a bowel movement today as well. He states that his pain is not relieved with hydrocodone. He states that occasionally he is having pain in the left side of his back as well.  He is complaining of nausea without vomiting.  He denies any fever or chills.     Endocrine: Negative.    Genitourinary: Negative.    Musculoskeletal: Negative.    Skin: Negative.    Allergic/Immunologic: Negative.    Neurological: Negative.    Hematological:  "Negative.    Psychiatric/Behavioral: Negative.    All other systems reviewed and are negative.      Past Medical History:   Diagnosis Date   • Epistaxis    • High cholesterol    • URI (upper respiratory infection)        No Known Allergies    History reviewed. No pertinent surgical history.    History reviewed. No pertinent family history.    Social History     Social History   • Marital status: Single     Spouse name: N/A   • Number of children: N/A   • Years of education: N/A     Social History Main Topics   • Smoking status: Never Smoker   • Smokeless tobacco: Never Used   • Alcohol use Yes      Comment: Socially   • Drug use: No   • Sexual activity: Defer     Other Topics Concern   • None     Social History Narrative   • None       Prior to Admission medications    Medication Sig Start Date End Date Taking? Authorizing Provider   atorvastatin (LIPITOR) 20 MG tablet Take 20 mg by mouth Daily.   Yes Historical Provider, MD   HYDROcodone-acetaminophen (NORCO) 5-325 MG per tablet Take 1 tablet by mouth Every 6 (Six) Hours As Needed for Moderate Pain . 2/3/18  Yes FRANCISCO Ramirez   raNITIdine (ZANTAC) 150 MG tablet Take 150 mg by mouth 2 (Two) Times a Day.   Yes Historical Provider, MD       /68  Pulse 53  Temp 98.1 °F (36.7 °C) (Temporal Artery )   Resp 18  Ht 177.8 cm (70\")  Wt 86.2 kg (190 lb)  SpO2 100%  BMI 27.26 kg/m2    Objective   Physical Exam   Constitutional: He is oriented to person, place, and time. He appears well-developed and well-nourished.   HENT:   Head: Normocephalic and atraumatic.   Eyes: Conjunctivae and EOM are normal. Pupils are equal, round, and reactive to light.   Neck: Normal range of motion. Neck supple.   Cardiovascular: Normal rate, regular rhythm, normal heart sounds and intact distal pulses.    Pulmonary/Chest: Effort normal and breath sounds normal.   Abdominal: Soft. Bowel sounds are normal.   Moderate tenderness noted to left inguinal area. Mild soft tissue " tenderness noted. abd soft, nondistended. bs x 4 quads.    Musculoskeletal: Normal range of motion.   Neurological: He is alert and oriented to person, place, and time. He has normal reflexes.   Skin: Skin is warm and dry.   Psychiatric: He has a normal mood and affect. His behavior is normal. Judgment and thought content normal.   Nursing note and vitals reviewed.      Procedures         Lab Results (last 24 hours)     Procedure Component Value Units Date/Time    CBC & Differential [[id number]] Collected:  02/04/18 1416    Specimen:  Blood Updated:  02/04/18 1434    Narrative:       The following orders were created for panel order CBC & Differential.  Procedure                               Abnormality         Status                     ---------                               -----------         ------                     CBC Auto Differential[[id number]]        Abnormal            Final result                 Please view results for these tests on the individual orders.    Comprehensive Metabolic Panel [[id number]]  (Abnormal) Collected:  02/04/18 1416    Specimen:  Blood Updated:  02/04/18 1445     Glucose 90 mg/dL      BUN 18 mg/dL      Creatinine 1.02 mg/dL      Sodium 142 mmol/L      Potassium 4.3 mmol/L      Chloride 102 mmol/L      CO2 28.0 mmol/L      Calcium 9.6 mg/dL      Total Protein 7.9 g/dL      Albumin 4.60 g/dL      ALT (SGPT) 51 U/L      AST (SGOT) 32 U/L      Alkaline Phosphatase 100 U/L      Total Bilirubin 1.4 (H) mg/dL      eGFR Non African Amer 92 mL/min/1.73      Globulin 3.3 gm/dL      A/G Ratio 1.4 g/dL      BUN/Creatinine Ratio 17.6     Anion Gap 12.0 mmol/L     Lipase [[id number]]  (Normal) Collected:  02/04/18 1416    Specimen:  Blood Updated:  02/04/18 1445     Lipase 29 U/L     Amylase [[id number]]  (Normal) Collected:  02/04/18 1416    Specimen:  Blood Updated:  02/04/18 1445     Amylase 66 U/L     CBC Auto Differential [[id number]]  (Abnormal) Collected:  02/04/18 1416    Specimen:  Blood  Updated:  02/04/18 1434     WBC 9.36 10*3/mm3      RBC 4.99 10*6/mm3      Hemoglobin 14.5 g/dL      Hematocrit 42.9 %      MCV 86.0 fL      MCH 29.1 pg      MCHC 33.8 g/dL      RDW 12.1 %      RDW-SD 37.7 (L) fl      MPV 10.3 fL      Platelets 284 10*3/mm3      Neutrophil % 79.5 (H) %      Lymphocyte % 13.2 (L) %      Monocyte % 6.7 %      Eosinophil % 0.1 %      Basophil % 0.3 %      Immature Grans % 0.2 %      Neutrophils, Absolute 7.43 10*3/mm3      Lymphocytes, Absolute 1.24 10*3/mm3      Monocytes, Absolute 0.63 10*3/mm3      Eosinophils, Absolute 0.01 10*3/mm3      Basophils, Absolute 0.03 10*3/mm3      Immature Grans, Absolute 0.02 10*3/mm3      nRBC 0.0 /100 WBC           CT Abdomen Pelvis With Contrast   ED Interpretation   1. Distal nonobstructive left ureteral stone measuring approximately 4   mm in size, approximately 4 mm from the right UVJ.   2. Hepatic steatosis.               This report was finalized on 02/04/2018 14:54 by Dr. Markus Lockwood MD.      Final Result   1. Distal nonobstructive left ureteral stone measuring approximately 4   mm in size, approximately 4 mm from the right UVJ.   2. Hepatic steatosis.               This report was finalized on 02/04/2018 14:54 by Dr. Markus Lockwood MD.          ED Course  ED Course   Comment By Time   Reviewed results with pt and pt spouse. Pt states that pain is better, but is still about 2 on scale of 1-10. Advised that he would need to follow up with urology and general surgery as well. Maxx report completed #[id number]. No signs of suspicious activity noted. Will write for small amt of pain medication for acute pain. Reviewed side effects and potential for abuse. Advised to increase water intake. Advised to return before if symptoms worsen, increased pain, inability to urinate. Nano Weldon, APRN 02/04 1096          MDM  Number of Diagnoses or Management Options  Kidney stone: new and requires workup  Unilateral inguinal hernia without  obstruction or gangrene, recurrence not specified: new and requires workup     Amount and/or Complexity of Data Reviewed  Clinical lab tests: ordered and reviewed  Tests in the radiology section of CPT®: ordered and reviewed  Independent visualization of images, tracings, or specimens: yes    Patient Progress  Patient progress: stable      Final diagnoses:   Kidney stone   Unilateral inguinal hernia without obstruction or gangrene, recurrence not specified          Nano Weldon, APRN  02/04/18 7471

## 2018-02-04 NOTE — ED NOTES
C/o's 'left testicle pain, started yesterday morning at 5am, was here yesterday morning, went home with pain meds, they're not really helping, since the Morphine wore off'     Mile Marc, REBECCA  02/04/18 1081

## 2018-02-04 NOTE — ED NOTES
Discharge instructions reviewed with no additional questions at this time. Scripts x3.  NAD.  VSS.  Pt. Alert and oriented x4. No other needs voiced at this time.  Resps even and unlabored.  Pt. Ambulatory with steady gait to nohemi, pt refusing the need for a wheelchair at this time.  Skin warm pink, and dry.  Discharge instructions reviewed with pt. As well as how to take medications.  Pt. Verbalized understanding at this time.  Friend at bedside to take pt. Home.       Shu Kim RN  02/04/18 1438

## 2018-04-05 ENCOUNTER — APPOINTMENT (OUTPATIENT)
Dept: GENERAL RADIOLOGY | Facility: HOSPITAL | Age: 23
End: 2018-04-05

## 2018-04-05 ENCOUNTER — HOSPITAL ENCOUNTER (EMERGENCY)
Facility: HOSPITAL | Age: 23
Discharge: HOME | End: 2018-04-06
Attending: EMERGENCY MEDICINE | Admitting: EMERGENCY MEDICINE

## 2018-04-05 DIAGNOSIS — N20.0 KIDNEY STONE: Primary | ICD-10-CM

## 2018-04-05 LAB
ALBUMIN SERPL-MCNC: 4.7 G/DL (ref 3.5–5)
ALBUMIN/GLOB SERPL: 1.5 G/DL (ref 1.1–2.5)
ALP SERPL-CCNC: 103 U/L (ref 24–120)
ALT SERPL W P-5'-P-CCNC: 46 U/L (ref 0–54)
ANION GAP SERPL CALCULATED.3IONS-SCNC: 13 MMOL/L (ref 4–13)
AST SERPL-CCNC: 32 U/L (ref 7–45)
BACTERIA UR QL AUTO: ABNORMAL /HPF
BASOPHILS # BLD AUTO: 0.03 10*3/MM3 (ref 0–0.2)
BASOPHILS NFR BLD AUTO: 0.4 % (ref 0–2)
BILIRUB SERPL-MCNC: 1.1 MG/DL (ref 0.1–1)
BILIRUB UR QL STRIP: NEGATIVE
BUN BLD-MCNC: 26 MG/DL (ref 5–21)
BUN/CREAT SERPL: 24.5 (ref 7–25)
CALCIUM SPEC-SCNC: 9.6 MG/DL (ref 8.4–10.4)
CHLORIDE SERPL-SCNC: 100 MMOL/L (ref 98–110)
CLARITY UR: CLEAR
CO2 SERPL-SCNC: 29 MMOL/L (ref 24–31)
COLOR UR: YELLOW
CREAT BLD-MCNC: 1.06 MG/DL (ref 0.5–1.4)
DEPRECATED RDW RBC AUTO: 35.5 FL (ref 40–54)
EOSINOPHIL # BLD AUTO: 0.03 10*3/MM3 (ref 0–0.7)
EOSINOPHIL NFR BLD AUTO: 0.4 % (ref 0–4)
ERYTHROCYTE [DISTWIDTH] IN BLOOD BY AUTOMATED COUNT: 11.8 % (ref 12–15)
GFR SERPL CREATININE-BSD FRML MDRD: 88 ML/MIN/1.73
GLOBULIN UR ELPH-MCNC: 3.2 GM/DL
GLUCOSE BLD-MCNC: 111 MG/DL (ref 70–100)
GLUCOSE UR STRIP-MCNC: NEGATIVE MG/DL
HCT VFR BLD AUTO: 40.9 % (ref 40–52)
HGB BLD-MCNC: 14.5 G/DL (ref 14–18)
HGB UR QL STRIP.AUTO: ABNORMAL
HYALINE CASTS UR QL AUTO: ABNORMAL /LPF
IMM GRANULOCYTES # BLD: 0.03 10*3/MM3 (ref 0–0.03)
IMM GRANULOCYTES NFR BLD: 0.4 % (ref 0–5)
KETONES UR QL STRIP: NEGATIVE
LEUKOCYTE ESTERASE UR QL STRIP.AUTO: NEGATIVE
LYMPHOCYTES # BLD AUTO: 2.76 10*3/MM3 (ref 0.72–4.86)
LYMPHOCYTES NFR BLD AUTO: 36.4 % (ref 15–45)
MCH RBC QN AUTO: 29.6 PG (ref 28–32)
MCHC RBC AUTO-ENTMCNC: 35.5 G/DL (ref 33–36)
MCV RBC AUTO: 83.5 FL (ref 82–95)
MONOCYTES # BLD AUTO: 0.78 10*3/MM3 (ref 0.19–1.3)
MONOCYTES NFR BLD AUTO: 10.3 % (ref 4–12)
NEUTROPHILS # BLD AUTO: 3.95 10*3/MM3 (ref 1.87–8.4)
NEUTROPHILS NFR BLD AUTO: 52.1 % (ref 39–78)
NITRITE UR QL STRIP: NEGATIVE
NRBC BLD MANUAL-RTO: 0 /100 WBC (ref 0–0)
PH UR STRIP.AUTO: <=5 [PH] (ref 5–8)
PLATELET # BLD AUTO: 256 10*3/MM3 (ref 130–400)
PMV BLD AUTO: 10 FL (ref 6–12)
POTASSIUM BLD-SCNC: 3.7 MMOL/L (ref 3.5–5.3)
PROT SERPL-MCNC: 7.9 G/DL (ref 6.3–8.7)
PROT UR QL STRIP: ABNORMAL
RBC # BLD AUTO: 4.9 10*6/MM3 (ref 4.8–5.9)
RBC # UR: ABNORMAL /HPF
REF LAB TEST METHOD: ABNORMAL
SODIUM BLD-SCNC: 142 MMOL/L (ref 135–145)
SP GR UR STRIP: >=1.03 (ref 1–1.03)
SQUAMOUS #/AREA URNS HPF: ABNORMAL /HPF
UROBILINOGEN UR QL STRIP: ABNORMAL
WBC NRBC COR # BLD: 7.58 10*3/MM3 (ref 4.8–10.8)
WBC UR QL AUTO: ABNORMAL /HPF

## 2018-04-05 PROCEDURE — 96374 THER/PROPH/DIAG INJ IV PUSH: CPT

## 2018-04-05 PROCEDURE — 74018 RADEX ABDOMEN 1 VIEW: CPT

## 2018-04-05 PROCEDURE — 25010000002 KETOROLAC TROMETHAMINE PER 15 MG: Performed by: NURSE PRACTITIONER

## 2018-04-05 PROCEDURE — 99283 EMERGENCY DEPT VISIT LOW MDM: CPT

## 2018-04-05 PROCEDURE — 96361 HYDRATE IV INFUSION ADD-ON: CPT

## 2018-04-05 PROCEDURE — 85025 COMPLETE CBC W/AUTO DIFF WBC: CPT | Performed by: NURSE PRACTITIONER

## 2018-04-05 PROCEDURE — 81001 URINALYSIS AUTO W/SCOPE: CPT | Performed by: NURSE PRACTITIONER

## 2018-04-05 PROCEDURE — 80053 COMPREHEN METABOLIC PANEL: CPT | Performed by: NURSE PRACTITIONER

## 2018-04-05 RX ORDER — SODIUM CHLORIDE 0.9 % (FLUSH) 0.9 %
10 SYRINGE (ML) INJECTION AS NEEDED
Status: DISCONTINUED | OUTPATIENT
Start: 2018-04-05 | End: 2018-04-06 | Stop reason: HOSPADM

## 2018-04-05 RX ORDER — KETOROLAC TROMETHAMINE 30 MG/ML
30 INJECTION, SOLUTION INTRAMUSCULAR; INTRAVENOUS ONCE
Status: COMPLETED | OUTPATIENT
Start: 2018-04-05 | End: 2018-04-05

## 2018-04-05 RX ADMIN — KETOROLAC TROMETHAMINE 30 MG: 30 INJECTION, SOLUTION INTRAMUSCULAR at 22:24

## 2018-04-05 RX ADMIN — SODIUM CHLORIDE 1000 ML: 9 INJECTION, SOLUTION INTRAVENOUS at 22:25

## 2018-04-06 ENCOUNTER — APPOINTMENT (OUTPATIENT)
Dept: CT IMAGING | Facility: HOSPITAL | Age: 23
End: 2018-04-06

## 2018-04-06 VITALS
RESPIRATION RATE: 14 BRPM | HEART RATE: 58 BPM | HEIGHT: 70 IN | BODY MASS INDEX: 27.92 KG/M2 | DIASTOLIC BLOOD PRESSURE: 50 MMHG | OXYGEN SATURATION: 96 % | WEIGHT: 195 LBS | SYSTOLIC BLOOD PRESSURE: 120 MMHG | TEMPERATURE: 98.1 F

## 2018-04-06 PROCEDURE — 74177 CT ABD & PELVIS W/CONTRAST: CPT

## 2018-04-06 PROCEDURE — 25010000002 IOPAMIDOL 61 % SOLUTION: Performed by: EMERGENCY MEDICINE

## 2018-04-06 RX ORDER — KETOROLAC TROMETHAMINE 10 MG/1
10 TABLET, FILM COATED ORAL EVERY 6 HOURS PRN
Qty: 15 TABLET | Refills: 0 | Status: SHIPPED | OUTPATIENT
Start: 2018-04-06 | End: 2019-10-12

## 2018-04-06 RX ADMIN — IOPAMIDOL 100 ML: 612 INJECTION, SOLUTION INTRAVENOUS at 01:18

## 2018-04-06 NOTE — DISCHARGE INSTRUCTIONS
Earl,    Please increase your fluid hydration at home until your urine is clear to pale yellow and you are urinating every 3-4 hours. Please return for worsening pain, fevers, shaking chills or inability to urinate.

## 2018-04-06 NOTE — ED PROVIDER NOTES
Subjective   Patient is a 22-year-old  male who presents to the ER today with complaint of right lower quadrant pain that radiates down into the right testicle.  The patient reports that he has had this pain intermittently since February 2018.  At that time he was seen in this ER and diagnosed with a right-sided renal stone.  The patient states that he try to follow-up the urologist however his primary care provider did not refer him to one.  The patient states that on Tuesday he had pain in the right lower quadrant area that lasted for approximately 20 minutes and went away.  He states tonight the pain began again at approximately 8:30.  The patient states that the pain radiates down into the right testicle.  He denies any dysuria, or difficulty with urination.  The patient denies fever.  He denies any nausea or vomiting.  He presents ER today for further evaluation.        History provided by:  Patient   used: No    Abdominal Pain   Pain location:  RLQ  Pain quality: aching and cramping    Pain radiates to:  Groin  Pain severity:  Mild  Onset quality:  Sudden  Duration:  1 hour  Timing:  Constant  Progression:  Unchanged  Chronicity:  Recurrent  Context: not alcohol use, not awakening from sleep, not diet changes, not eating, not laxative use, not medication withdrawal, not previous surgeries, not recent illness, not recent sexual activity, not recent travel, not retching, not sick contacts, not suspicious food intake and not trauma    Relieved by:  Nothing  Worsened by:  Nothing  Ineffective treatments:  None tried  Associated symptoms: no anorexia, no belching, no chest pain, no chills, no constipation, no cough, no diarrhea, no dysuria, no fatigue, no fever, no flatus, no hematemesis, no hematochezia, no hematuria, no melena, no nausea, no shortness of breath, no sore throat, no vaginal bleeding, no vaginal discharge and no vomiting    Risk factors: no alcohol abuse, no aspirin use,  not elderly, has not had multiple surgeries, no NSAID use, not obese, not pregnant and no recent hospitalization        Review of Systems   Constitutional: Negative for chills, fatigue and fever.   HENT: Negative for sore throat.    Respiratory: Negative for cough and shortness of breath.    Cardiovascular: Negative for chest pain.   Gastrointestinal: Positive for abdominal pain. Negative for anorexia, constipation, diarrhea, flatus, hematemesis, hematochezia, melena, nausea and vomiting.   Genitourinary: Negative for dysuria, hematuria, vaginal bleeding and vaginal discharge.   All other systems reviewed and are negative.      Past Medical History:   Diagnosis Date   • Epistaxis    • High cholesterol    • URI (upper respiratory infection)        No Known Allergies    History reviewed. No pertinent surgical history.    History reviewed. No pertinent family history.    Social History     Social History   • Marital status: Single     Social History Main Topics   • Smoking status: Never Smoker   • Smokeless tobacco: Never Used   • Alcohol use Yes      Comment: Socially   • Drug use: No   • Sexual activity: Defer     Other Topics Concern   • Not on file           Objective   Physical Exam   Constitutional: He is oriented to person, place, and time. He appears well-developed and well-nourished.   HENT:   Head: Normocephalic and atraumatic.   Cardiovascular: Normal rate, regular rhythm and normal heart sounds.    Pulmonary/Chest: Effort normal and breath sounds normal.   Abdominal: Bowel sounds are normal. There is tenderness in the right lower quadrant.       Neurological: He is alert and oriented to person, place, and time.   Skin: Skin is warm and dry. Capillary refill takes less than 2 seconds.   Psychiatric: He has a normal mood and affect.   Nursing note and vitals reviewed.      Procedures         ED Course  ED Course   Comment By Time   The patient's white blood cell count is normal.  Urinalysis shows a moderate  amount of blood, or to 13-20 RBCs, 0-2 WBCs, no bacteria  Other labs are unremarkable.  Tracey MENESES Luis Daniel, FRANCISCO 04/06 0003   The patient's previous CT scan done in February 2018 showed a left sided renal stone.  The patient is now having right lower quadrant pain.Procedure CT abdomen and pelvis. Tracey M FRANCISCO Cary 04/06 0004   Pt case discussed with Dr. Martinez who will assume pt care.  Tracey MENESES Luis Daniel, FRANCISCO 04/06 0004   Fabian score: 3 Tracey M Luis Daniel, APRN 04/06 0004   Mild right hydoureternoephrosis with 2 mm stone in distal right urter.  Alex Martinez MD 04/06 0332   Endorsed to me, waiting on CT. Ct with small stone noted. Patient is pain free aware of reasons for return and need for follow up, all questions answered.  Alex Martinez MD 04/06 0332                  MDM  Number of Diagnoses or Management Options  Right lower quadrant abdominal pain:      Amount and/or Complexity of Data Reviewed  Independent visualization of images, tracings, or specimens: yes        Final diagnoses:   Kidney stone            Alex Martinez MD  04/06/18 033

## 2018-04-08 ENCOUNTER — APPOINTMENT (OUTPATIENT)
Dept: GENERAL RADIOLOGY | Facility: HOSPITAL | Age: 23
End: 2018-04-08

## 2018-04-08 ENCOUNTER — HOSPITAL ENCOUNTER (EMERGENCY)
Facility: HOSPITAL | Age: 23
Discharge: HOME | End: 2018-04-08
Attending: EMERGENCY MEDICINE | Admitting: EMERGENCY MEDICINE

## 2018-04-08 VITALS
RESPIRATION RATE: 14 BRPM | WEIGHT: 190 LBS | HEIGHT: 70 IN | BODY MASS INDEX: 27.2 KG/M2 | HEART RATE: 63 BPM | DIASTOLIC BLOOD PRESSURE: 72 MMHG | TEMPERATURE: 98.3 F | OXYGEN SATURATION: 99 % | SYSTOLIC BLOOD PRESSURE: 149 MMHG

## 2018-04-08 DIAGNOSIS — R03.0 BLOOD PRESSURE ELEVATED WITHOUT HISTORY OF HTN: ICD-10-CM

## 2018-04-08 DIAGNOSIS — D72.829 LEUKOCYTOSIS, UNSPECIFIED TYPE: ICD-10-CM

## 2018-04-08 DIAGNOSIS — N20.1 RIGHT URETERAL STONE: Primary | ICD-10-CM

## 2018-04-08 LAB
ALBUMIN SERPL-MCNC: 4.4 G/DL (ref 3.5–5)
ALBUMIN/GLOB SERPL: 1.4 G/DL (ref 1.1–2.5)
ALP SERPL-CCNC: 91 U/L (ref 24–120)
ALT SERPL W P-5'-P-CCNC: 32 U/L (ref 0–54)
ANION GAP SERPL CALCULATED.3IONS-SCNC: 12 MMOL/L (ref 4–13)
AST SERPL-CCNC: 30 U/L (ref 7–45)
BACTERIA UR QL AUTO: ABNORMAL /HPF
BASOPHILS # BLD AUTO: 0.04 10*3/MM3 (ref 0–0.2)
BASOPHILS NFR BLD AUTO: 0.4 % (ref 0–2)
BILIRUB SERPL-MCNC: 1.1 MG/DL (ref 0.1–1)
BILIRUB UR QL STRIP: NEGATIVE
BUN BLD-MCNC: 20 MG/DL (ref 5–21)
BUN/CREAT SERPL: 13.5 (ref 7–25)
CALCIUM SPEC-SCNC: 9.2 MG/DL (ref 8.4–10.4)
CHLORIDE SERPL-SCNC: 101 MMOL/L (ref 98–110)
CLARITY UR: CLEAR
CO2 SERPL-SCNC: 28 MMOL/L (ref 24–31)
COLOR UR: YELLOW
CREAT BLD-MCNC: 1.48 MG/DL (ref 0.5–1.4)
DEPRECATED RDW RBC AUTO: 35.5 FL (ref 40–54)
EOSINOPHIL # BLD AUTO: 0.03 10*3/MM3 (ref 0–0.7)
EOSINOPHIL NFR BLD AUTO: 0.3 % (ref 0–4)
ERYTHROCYTE [DISTWIDTH] IN BLOOD BY AUTOMATED COUNT: 11.6 % (ref 12–15)
GFR SERPL CREATININE-BSD FRML MDRD: 60 ML/MIN/1.73
GLOBULIN UR ELPH-MCNC: 3.2 GM/DL
GLUCOSE BLD-MCNC: 121 MG/DL (ref 70–100)
GLUCOSE UR STRIP-MCNC: NEGATIVE MG/DL
HCT VFR BLD AUTO: 41.8 % (ref 40–52)
HGB BLD-MCNC: 14.5 G/DL (ref 14–18)
HGB UR QL STRIP.AUTO: ABNORMAL
HOLD SPECIMEN: NORMAL
HOLD SPECIMEN: NORMAL
HYALINE CASTS UR QL AUTO: ABNORMAL /LPF
IMM GRANULOCYTES # BLD: 0.05 10*3/MM3 (ref 0–0.03)
IMM GRANULOCYTES NFR BLD: 0.4 % (ref 0–5)
KETONES UR QL STRIP: ABNORMAL
LEUKOCYTE ESTERASE UR QL STRIP.AUTO: NEGATIVE
LIPASE SERPL-CCNC: 24 U/L (ref 23–203)
LYMPHOCYTES # BLD AUTO: 1.63 10*3/MM3 (ref 0.72–4.86)
LYMPHOCYTES NFR BLD AUTO: 14.4 % (ref 15–45)
MAGNESIUM SERPL-MCNC: 1.9 MG/DL (ref 1.4–2.2)
MCH RBC QN AUTO: 29.4 PG (ref 28–32)
MCHC RBC AUTO-ENTMCNC: 34.7 G/DL (ref 33–36)
MCV RBC AUTO: 84.8 FL (ref 82–95)
MONOCYTES # BLD AUTO: 0.99 10*3/MM3 (ref 0.19–1.3)
MONOCYTES NFR BLD AUTO: 8.7 % (ref 4–12)
NEUTROPHILS # BLD AUTO: 8.61 10*3/MM3 (ref 1.87–8.4)
NEUTROPHILS NFR BLD AUTO: 75.8 % (ref 39–78)
NITRITE UR QL STRIP: NEGATIVE
NRBC BLD MANUAL-RTO: 0 /100 WBC (ref 0–0)
PH UR STRIP.AUTO: 6 [PH] (ref 5–8)
PLATELET # BLD AUTO: 255 10*3/MM3 (ref 130–400)
PMV BLD AUTO: 9.9 FL (ref 6–12)
POTASSIUM BLD-SCNC: 4.1 MMOL/L (ref 3.5–5.3)
PROT SERPL-MCNC: 7.6 G/DL (ref 6.3–8.7)
PROT UR QL STRIP: NEGATIVE
RBC # BLD AUTO: 4.93 10*6/MM3 (ref 4.8–5.9)
RBC # UR: ABNORMAL /HPF
REF LAB TEST METHOD: ABNORMAL
SODIUM BLD-SCNC: 141 MMOL/L (ref 135–145)
SP GR UR STRIP: 1.03 (ref 1–1.03)
SQUAMOUS #/AREA URNS HPF: ABNORMAL /HPF
UROBILINOGEN UR QL STRIP: ABNORMAL
WBC NRBC COR # BLD: 11.35 10*3/MM3 (ref 4.8–10.8)
WBC UR QL AUTO: ABNORMAL /HPF
WHOLE BLOOD HOLD SPECIMEN: NORMAL
WHOLE BLOOD HOLD SPECIMEN: NORMAL

## 2018-04-08 PROCEDURE — 99283 EMERGENCY DEPT VISIT LOW MDM: CPT

## 2018-04-08 PROCEDURE — 80053 COMPREHEN METABOLIC PANEL: CPT | Performed by: EMERGENCY MEDICINE

## 2018-04-08 PROCEDURE — 81001 URINALYSIS AUTO W/SCOPE: CPT | Performed by: EMERGENCY MEDICINE

## 2018-04-08 PROCEDURE — 85025 COMPLETE CBC W/AUTO DIFF WBC: CPT | Performed by: EMERGENCY MEDICINE

## 2018-04-08 PROCEDURE — 96372 THER/PROPH/DIAG INJ SC/IM: CPT

## 2018-04-08 PROCEDURE — 83735 ASSAY OF MAGNESIUM: CPT | Performed by: EMERGENCY MEDICINE

## 2018-04-08 PROCEDURE — 74018 RADEX ABDOMEN 1 VIEW: CPT

## 2018-04-08 PROCEDURE — 25010000002 KETOROLAC TROMETHAMINE PER 15 MG: Performed by: EMERGENCY MEDICINE

## 2018-04-08 PROCEDURE — 83690 ASSAY OF LIPASE: CPT | Performed by: EMERGENCY MEDICINE

## 2018-04-08 RX ORDER — KETOROLAC TROMETHAMINE 30 MG/ML
30 INJECTION, SOLUTION INTRAMUSCULAR; INTRAVENOUS ONCE
Status: COMPLETED | OUTPATIENT
Start: 2018-04-08 | End: 2018-04-08

## 2018-04-08 RX ORDER — KETOROLAC TROMETHAMINE 15 MG/ML
10 INJECTION, SOLUTION INTRAMUSCULAR; INTRAVENOUS ONCE
Status: DISCONTINUED | OUTPATIENT
Start: 2018-04-08 | End: 2018-04-08

## 2018-04-08 RX ADMIN — KETOROLAC TROMETHAMINE 30 MG: 30 INJECTION, SOLUTION INTRAMUSCULAR at 23:10

## 2018-04-09 ENCOUNTER — TELEPHONE (OUTPATIENT)
Dept: UROLOGY | Facility: CLINIC | Age: 23
End: 2018-04-09

## 2018-04-09 DIAGNOSIS — N20.0 KIDNEY STONES: Primary | ICD-10-CM

## 2018-04-09 NOTE — ED PROVIDER NOTES
"Subjective   Patient is a 22-year-old male who was seen in this emergency department on Thursday (04/05/2018) evening as the patient began having right flank pain at 7:30 PM on Thursday (04/05/2018) and a KUB was performed with \"no acute findings\"  and then a CT scan of abdomen and pelvis was performed in the early morning hours of Friday (04/06/2018) and was read as \"obstructing 1-2 mm stone in the distal right ureter\".  Patient reports that as he was discharged from this emergency department at 5 AM on Friday (04/06/2018) his right flank pain had resolved.  He reports he was having no pain since then until he awoke at 7 AM this morning (Sunday--04/08/2018) with  similar type right flank \"pain\" (and occasionally it is \"sharp\" pain) as well as he began having on Thursday (04/05/2018) evening.  He reports that since the pain started this morning (with occasional nausea) he has taken the Toradol (that he was previously prescribed) but the Toradol has not improved his pain.  He reports the pain is worsened by nothing and relieved by nothing.  He reports the pain currently is a 6 (out of 10) and at its worse is a 10 (out of 10).            Review of Systems   Constitutional: Negative.    HENT: Negative.    Eyes: Negative.    Respiratory: Negative.    Cardiovascular: Negative.    Gastrointestinal: Positive for abdominal pain and nausea.   Endocrine: Negative.    Genitourinary: Negative.    Musculoskeletal: Negative.    Skin: Negative.    Allergic/Immunologic: Negative.    Neurological: Negative.    Hematological: Negative.    Psychiatric/Behavioral: Negative.    All other systems reviewed and are negative.      Past Medical History:   Diagnosis Date   • Epistaxis    • High cholesterol    • URI (upper respiratory infection)        No Known Allergies    History reviewed. No pertinent surgical history.    History reviewed. No pertinent family history.    Social History     Social History   • Marital status:  "     Social History Main Topics   • Smoking status: Never Smoker   • Smokeless tobacco: Never Used   • Alcohol use Yes      Comment: Socially   • Drug use: No   • Sexual activity: Defer     Other Topics Concern   • Not on file           Objective   Physical Exam   Constitutional: He is oriented to person, place, and time. He appears well-developed and well-nourished.   HENT:   Head: Normocephalic and atraumatic.   Right Ear: External ear normal.   Left Ear: External ear normal.   Nose: Nose normal.   Mouth/Throat: Oropharynx is clear and moist.   Eyes: Conjunctivae and EOM are normal. Pupils are equal, round, and reactive to light. Right eye exhibits no discharge. Left eye exhibits no discharge.   Neck: Normal range of motion. Neck supple. No tracheal deviation present. No thyromegaly present.   Cardiovascular: Normal rate, regular rhythm, normal heart sounds and intact distal pulses.    No murmur heard.  Pulmonary/Chest: Effort normal and breath sounds normal. No respiratory distress. He exhibits no tenderness.   Abdominal: Soft. He exhibits no distension. There is tenderness (Mild pain to palpation of the right lower and mid right side of the abdomen.).   Musculoskeletal: Normal range of motion. He exhibits no edema, tenderness or deformity.   Neurological: He is alert and oriented to person, place, and time. No cranial nerve deficit.   Skin: Skin is warm and dry. No erythema. No pallor.   Psychiatric: He has a normal mood and affect. Judgment normal.   Nursing note and vitals reviewed.      Procedures         ED Course  ED Course   Comment By Time   Patient's laboratory and radiology results were discussed with the patient in details.  The patient reports that he would like to be discharged home at this time.  The patient reports that he is going to call the urology office tomorrow (Monday--04/09/2018) to be seen. Louis Matias MD 04/08 6308   Patient is instructed to follow-up with his primary care  provider and the urologist tomorrow (04/09/2018). Louis Matias MD 04/08 5440                  MDM  Number of Diagnoses or Management Options  Blood pressure elevated without history of HTN: new and requires workup  Leukocytosis, unspecified type: new and requires workup  Right ureteral stone: established and worsening     Amount and/or Complexity of Data Reviewed  Clinical lab tests: reviewed and ordered  Tests in the radiology section of CPT®: reviewed and ordered    Risk of Complications, Morbidity, and/or Mortality  Presenting problems: moderate  Diagnostic procedures: moderate  Management options: low    Patient Progress  Patient progress: stable      Final diagnoses:   Right ureteral stone   Blood pressure elevated without history of HTN   Leukocytosis, unspecified type            Louis Matias MD  04/13/18 1123

## 2018-04-09 NOTE — DISCHARGE INSTRUCTIONS
How to Take Your Blood Pressure  You can take your blood pressure at home with a machine. You may need to check your blood pressure at home:  · To check if you have high blood pressure (hypertension).  · To check your blood pressure over time.  · To make sure your blood pressure medicine is working.  Supplies needed:  You will need a blood pressure machine, or monitor. You can buy one at a [employer] or online. When choosing one:  · Choose one with an arm cuff.  · Choose one that wraps around your upper arm. Only one finger should fit between your arm and the cuff.  · Do not choose one that measures your blood pressure from your wrist or finger.  Your doctor can suggest a monitor.  How to prepare  Avoid these things for 30 minutes before checking your blood pressure:  · Drinking caffeine.  · Drinking alcohol.  · Eating.  · Smoking.  · Exercising.  Five minutes before checking your blood pressure:  · Pee.  · Sit in a dining chair. Avoid sitting in a soft couch or armchair.  · Be quiet. Do not talk.  How to take your blood pressure  Follow the instructions that came with your machine. If you have a digital blood pressure monitor, these may be the instructions:  1. Sit up straight.  2. Place your feet on the floor. Do not cross your ankles or legs.  3. Rest your left arm at the level of your heart. You may rest it on a table, desk, or chair.  4. Pull up your shirt sleeve.  5. Wrap the blood pressure cuff around the upper part of your left arm. The cuff should be 1 inch (2.5 cm) above your elbow. It is best to wrap the cuff around bare skin.  6. Fit the cuff snugly around your arm. You should be able to place only one finger between the cuff and your arm.  7. Put the cord inside the groove of your elbow.  8. Press the power button.  9. Sit quietly while the cuff fills with air and loses air.  10. Write down the numbers on the screen.  11. Wait 2-3 minutes and then repeat steps 1-10.  What do the numbers mean?  Two  "numbers make up your blood pressure. The first number is called systolic pressure. The second is called diastolic pressure. An example of a blood pressure reading is \"120 over 80\" (or 120/80).  If you are an adult and do not have a medical condition, use this guide to find out if your blood pressure is normal:  Normal   · First number: below 120.  · Second number: below 80.  Elevated   · First number: 120-129.  · Second number: below 80.  Hypertension stage 1   · First number: 130-139.  · Second number: 80-89.  Hypertension stage 2   · First number: 140 or above.  · Second number: 90 or above.  Your blood pressure is above normal even if only the top or bottom number is above normal.  Follow these instructions at home:  · Check your blood pressure as often as your doctor tells you to.  · Take your monitor to your next doctor's appointment. Your doctor will:  ¨ Make sure you are using it correctly.  ¨ Make sure it is working right.  · Make sure you understand what your blood pressure numbers should be.  · Tell your doctor if your medicines are causing side effects.  Contact a doctor if:  · Your blood pressure keeps being high.  Get help right away if:  · Your first blood pressure number is higher than 180.  · Your second blood pressure number is higher than 120.  This information is not intended to replace advice given to you by your health care provider. Make sure you discuss any questions you have with your health care provider.  Document Released: 11/30/2009 Document Revised: 11/15/2017 Document Reviewed: 05/26/2017  [employer] Interactive Patient Education © 2017 [employer]  Kidney Stones    Blood Pressure Record Sheet  Your blood pressure on this visit to the emergency department or clinic is elevated. This does not necessarily mean you have high blood pressure (hypertension), but it does mean that your blood pressure needs to be rechecked. Many times your blood pressure can increase due to illness, pain, " anxiety, or other factors.  We recommend that you get a series of blood pressure readings done over a period of 5 days. It is best to get a reading in the morning and one in the evening. You should make sure to sit and relax for 1-5 minutes before the reading is taken. Write the readings down and make a follow-up appointment with your health care provider to discuss the results. If there is not a free clinic or a drug store with a blood-pressure-taking machine near you, you can purchase blood-pressure-taking equipment from a drug store. Having one in the home allows you the convenience of taking your blood pressure while you are home and relaxed.  Blood Pressure Log  Date: _______________________  · a.m. _____________________  · p.m. _____________________  Date: _______________________  · a.m. _____________________  · p.m. _____________________  Date: _______________________  · a.m. _____________________  · p.m. _____________________  Date: _______________________  · a.m. _____________________  · p.m. _____________________  Date: _______________________  · a.m. _____________________  · p.m. _____________________  This information is not intended to replace advice given to you by your health care provider. Make sure you discuss any questions you have with your health care provider.  Document Released: 09/15/2004 Document Revised: 12/01/2017 Document Reviewed: 02/10/2015  [employer] Interactive Patient Education © 2017 [employer]  Kidney stones (urolithiasis) are rock-like masses that form inside of the kidneys. Kidneys are organs that make pee (urine). A kidney stone can cause very bad pain and can block the flow of pee. The stone usually leaves your body (passes) through your pee. You may need to have a doctor take out the stone.  Follow these instructions at home:  Eating and drinking   · Drink enough fluid to keep your pee clear or pale yellow. This will help you pass the stone.  · If told by your doctor, change  the foods you eat (your diet). This may include:  ¨ Limiting how much salt (sodium) you eat.  ¨ Eating more fruits and vegetables.  ¨ Limiting how much meat, poultry, fish, and eggs you eat.  · Follow instructions from your doctor about eating or drinking restrictions.  General instructions   · Collect pee samples as told by your doctor. You may need to collect a pee sample:  ¨ 24 hours after a stone comes out.  ¨ 8-12 weeks after a stone comes out, and every 6-12 months after that.  · Strain your pee every time you pee (urinate), for as long as told. Use the strainer that your doctor recommends.  · Do not throw out the stone. Keep it so that it can be tested by your doctor.  · Take over-the-counter and prescription medicines only as told by your doctor.  · Keep all follow-up visits as told by your doctor. This is important. You may need follow-up tests.  Preventing kidney stones   To prevent another kidney stone:  · Drink enough fluid to keep your pee clear or pale yellow. This is the best way to prevent kidney stones.  · Eat healthy foods.  · Avoid certain foods as told by your doctor. You may be told to eat less protein.  · Stay at a healthy weight.  Contact a doctor if:  · You have pain that gets worse or does not get better with medicine.  Get help right away if:  · You have a fever or chills.  · You get very bad pain.  · You get new pain in your belly (abdomen).  · You pass out (faint).  · You cannot pee.  This information is not intended to replace advice given to you by your health care provider. Make sure you discuss any questions you have with your health care provider.  Document Released: 06/05/2009 Document Revised: 09/05/2017 Document Reviewed: 09/05/2017  [employer] Interactive Patient Education © 2017 [employer]

## 2018-04-10 ENCOUNTER — HOSPITAL ENCOUNTER (OUTPATIENT)
Dept: GENERAL RADIOLOGY | Facility: HOSPITAL | Age: 23
Discharge: HOME | End: 2018-04-10
Attending: UROLOGY | Admitting: UROLOGY

## 2018-04-10 ENCOUNTER — OFFICE VISIT (OUTPATIENT)
Dept: UROLOGY | Facility: CLINIC | Age: 23
End: 2018-04-10

## 2018-04-10 VITALS
DIASTOLIC BLOOD PRESSURE: 86 MMHG | WEIGHT: 198.6 LBS | TEMPERATURE: 98.3 F | HEIGHT: 70 IN | BODY MASS INDEX: 28.43 KG/M2 | SYSTOLIC BLOOD PRESSURE: 130 MMHG

## 2018-04-10 DIAGNOSIS — R10.9 FLANK PAIN: Primary | ICD-10-CM

## 2018-04-10 DIAGNOSIS — N20.0 KIDNEY STONES: ICD-10-CM

## 2018-04-10 DIAGNOSIS — N20.1 RIGHT URETERAL STONE: ICD-10-CM

## 2018-04-10 DIAGNOSIS — Z87.442 HISTORY OF KIDNEY STONES: ICD-10-CM

## 2018-04-10 LAB
BILIRUB BLD-MCNC: NEGATIVE MG/DL
CLARITY, POC: CLEAR
COLOR UR: YELLOW
GLUCOSE UR STRIP-MCNC: NEGATIVE MG/DL
KETONES UR QL: NEGATIVE
LEUKOCYTE EST, POC: NEGATIVE
NITRITE UR-MCNC: NEGATIVE MG/ML
PH UR: 7 [PH] (ref 5–8)
PROT UR STRIP-MCNC: NEGATIVE MG/DL
RBC # UR STRIP: ABNORMAL /UL
SP GR UR: 1.02 (ref 1–1.03)
UROBILINOGEN UR QL: NORMAL

## 2018-04-10 PROCEDURE — 99204 OFFICE O/P NEW MOD 45 MIN: CPT | Performed by: UROLOGY

## 2018-04-10 PROCEDURE — 74018 RADEX ABDOMEN 1 VIEW: CPT

## 2018-04-10 PROCEDURE — 81001 URINALYSIS AUTO W/SCOPE: CPT | Performed by: UROLOGY

## 2018-04-10 NOTE — ED NOTES
"ED Call Back Questions    1. How are you doing since leaving the Emergency Department?    Feeling better, I have passed the stones  2. Do you have any questions about your discharge instructions? No     3. Have you filled your new prescriptions yet? Yes   a. Do you have any questions about those medications? No     4. Were you able to make a follow-up appointment with the physician? Yes     5. Do you have a primary care physician? Yes   a. If No, would you like for me to set you up with one? N/A  i. If Yes, “I will have our ED  give you a call right back at this number to work with you on the best time for an appointment.”    6. We are always looking to get better at what we do. Do you have any suggestions for what we can do to be even better? N/A  a. If Yes, \"Thank you for sharing your concerns. I apologize. I will follow up with our manager and patient . Would you like someone to call you back?\" No     7. Is there anything else I can do for you? No   Visit was very good     Rohit Saab  04/10/18 1230    "

## 2018-04-10 NOTE — PROGRESS NOTES
Mr. Lane is 22 y.o. male    Chief Complaint   Patient presents with   • Flank Pain       Flank Pain   This is a new problem. The current episode started in the past 7 days. The problem occurs intermittently. The problem has been rapidly improving since onset. Pain location: right flank pain. The quality of the pain is described as shooting. The pain is moderate. The symptoms are aggravated by position. Associated symptoms include abdominal pain. Pertinent negatives include no chest pain, dysuria, fever or headaches. Risk factors: history of kidney sotnes. He has tried analgesics and NSAIDs for the symptoms. The treatment provided moderate relief.       The following portions of the patient's history were reviewed and updated as appropriate: allergies, current medications, past family history, past medical history, past social history, past surgical history and problem list.    Review of Systems   Constitutional: Negative for chills and fever.   HENT: Positive for congestion. Negative for sore throat.    Eyes: Negative for pain and itching.   Respiratory: Negative for cough and shortness of breath.    Cardiovascular: Negative for chest pain.   Gastrointestinal: Positive for abdominal pain. Negative for anal bleeding and blood in stool.   Endocrine: Negative for cold intolerance and heat intolerance.   Genitourinary: Negative for difficulty urinating, dysuria, flank pain, frequency, hematuria and urgency.   Musculoskeletal: Negative for back pain and neck pain.   Skin: Negative for color change and rash.   Allergic/Immunologic: Negative for food allergies.   Neurological: Negative for dizziness and headaches.   Hematological: Does not bruise/bleed easily.   Psychiatric/Behavioral: Negative for confusion and sleep disturbance.         Current Outpatient Prescriptions:   •  ketorolac (TORADOL) 10 MG tablet, Take 1 tablet by mouth Every 6 (Six) Hours As Needed for Moderate Pain ., Disp: 15 tablet, Rfl: 0  •  atorvastatin  "(LIPITOR) 20 MG tablet, Take 20 mg by mouth Daily., Disp: , Rfl:   •  raNITIdine (ZANTAC) 150 MG tablet, Take 150 mg by mouth 2 (Two) Times a Day., Disp: , Rfl:   •  tamsulosin (FLOMAX) 0.4 MG capsule 24 hr capsule, Take 1 capsule by mouth Daily., Disp: 30 capsule, Rfl: 0    Past Medical History:   Diagnosis Date   • Epistaxis    • High cholesterol    • URI (upper respiratory infection)        History reviewed. No pertinent surgical history.    Social History     Social History   • Marital status:      Social History Main Topics   • Smoking status: Never Smoker   • Smokeless tobacco: Never Used   • Alcohol use Yes      Comment: Socially   • Drug use: No   • Sexual activity: Defer     Other Topics Concern   • Not on file       Family History   Problem Relation Age of Onset   • No Known Problems Father    • No Known Problems Mother        Objective    /86   Temp 98.3 °F (36.8 °C)   Ht 177.8 cm (70\")   Wt 90.1 kg (198 lb 9.6 oz)   BMI 28.50 kg/m²     Physical Exam  Constitutional: Well nourished, Well developed; No apparent distress  Psychiatric: Appropriate affect; Alert and oriented  Eyes: Unremarkable  Musculoskeletal: Normal gait and station  GI: Abdomen is soft, non-tender  Respiratory: No distress; Unlabored movement; No accessory musculature needed with symmetric movements  Skin: No pallor or diaphoresis          Admission on 04/08/2018, Discharged on 04/08/2018   Component Date Value Ref Range Status   • Extra Tube 04/08/2018 hold for add-on   Final   • Extra Tube 04/08/2018 Hold for add-ons.   Final   • Extra Tube 04/08/2018 hold for add-on   Final   • Extra Tube 04/08/2018 Hold for add-ons.   Final   • Color, UA 04/08/2018 Yellow  Yellow, Straw Final   • Appearance, UA 04/08/2018 Clear  Clear Final   • pH, UA 04/08/2018 6.0  5.0 - 8.0 Final   • Specific Gravity, UA 04/08/2018 1.027  1.005 - 1.030 Final   • Glucose, UA 04/08/2018 Negative  Negative Final   • Ketones, UA 04/08/2018 Trace* " Negative Final   • Bilirubin, UA 04/08/2018 Negative  Negative Final   • Blood, UA 04/08/2018 Small (1+)* Negative Final   • Protein, UA 04/08/2018 Negative  Negative Final   • Leuk Esterase, UA 04/08/2018 Negative  Negative Final   • Nitrite, UA 04/08/2018 Negative  Negative Final   • Urobilinogen, UA 04/08/2018 1.0 E.U./dL  0.2 - 1.0 E.U./dL Final   • RBC, UA 04/08/2018 13-20* None Seen /HPF Final   • WBC, UA 04/08/2018 0-2* None Seen /HPF Final   • Bacteria, UA 04/08/2018 None Seen  None Seen /HPF Final   • Squamous Epithelial Cells, UA 04/08/2018 0-2  None Seen, 0-2 /HPF Final   • Hyaline Casts, UA 04/08/2018 3-6  None Seen /LPF Final   • Methodology 04/08/2018 Automated Microscopy   Final   • Glucose 04/08/2018 121* 70 - 100 mg/dL Final   • BUN 04/08/2018 20  5 - 21 mg/dL Final   • Creatinine 04/08/2018 1.48* 0.50 - 1.40 mg/dL Final   • Sodium 04/08/2018 141  135 - 145 mmol/L Final   • Potassium 04/08/2018 4.1  3.5 - 5.3 mmol/L Final   • Chloride 04/08/2018 101  98 - 110 mmol/L Final   • CO2 04/08/2018 28.0  24.0 - 31.0 mmol/L Final   • Calcium 04/08/2018 9.2  8.4 - 10.4 mg/dL Final   • Total Protein 04/08/2018 7.6  6.3 - 8.7 g/dL Final   • Albumin 04/08/2018 4.40  3.50 - 5.00 g/dL Final   • ALT (SGPT) 04/08/2018 32  0 - 54 U/L Final   • AST (SGOT) 04/08/2018 30  7 - 45 U/L Final   • Alkaline Phosphatase 04/08/2018 91  24 - 120 U/L Final   • Total Bilirubin 04/08/2018 1.1* 0.1 - 1.0 mg/dL Final   • eGFR Non African Amer 04/08/2018 60* >60 mL/min/1.73 Final   • Globulin 04/08/2018 3.2  gm/dL Final   • A/G Ratio 04/08/2018 1.4  1.1 - 2.5 g/dL Final   • BUN/Creatinine Ratio 04/08/2018 13.5  7.0 - 25.0 Final   • Anion Gap 04/08/2018 12.0  4.0 - 13.0 mmol/L Final   • Lipase 04/08/2018 24  23 - 203 U/L Final   • Magnesium 04/08/2018 1.9  1.4 - 2.2 mg/dL Final   • WBC 04/08/2018 11.35* 4.80 - 10.80 10*3/mm3 Final   • RBC 04/08/2018 4.93  4.80 - 5.90 10*6/mm3 Final   • Hemoglobin 04/08/2018 14.5  14.0 - 18.0 g/dL Final    • Hematocrit 04/08/2018 41.8  40.0 - 52.0 % Final   • MCV 04/08/2018 84.8  82.0 - 95.0 fL Final   • MCH 04/08/2018 29.4  28.0 - 32.0 pg Final   • MCHC 04/08/2018 34.7  33.0 - 36.0 g/dL Final   • RDW 04/08/2018 11.6* 12.0 - 15.0 % Final   • RDW-SD 04/08/2018 35.5* 40.0 - 54.0 fl Final   • MPV 04/08/2018 9.9  6.0 - 12.0 fL Final   • Platelets 04/08/2018 255  130 - 400 10*3/mm3 Final   • Neutrophil % 04/08/2018 75.8  39.0 - 78.0 % Final   • Lymphocyte % 04/08/2018 14.4* 15.0 - 45.0 % Final   • Monocyte % 04/08/2018 8.7  4.0 - 12.0 % Final   • Eosinophil % 04/08/2018 0.3  0.0 - 4.0 % Final   • Basophil % 04/08/2018 0.4  0.0 - 2.0 % Final   • Immature Grans % 04/08/2018 0.4  0.0 - 5.0 % Final   • Neutrophils, Absolute 04/08/2018 8.61* 1.87 - 8.40 10*3/mm3 Final   • Lymphocytes, Absolute 04/08/2018 1.63  0.72 - 4.86 10*3/mm3 Final   • Monocytes, Absolute 04/08/2018 0.99  0.19 - 1.30 10*3/mm3 Final   • Eosinophils, Absolute 04/08/2018 0.03  0.00 - 0.70 10*3/mm3 Final   • Basophils, Absolute 04/08/2018 0.04  0.00 - 0.20 10*3/mm3 Final   • Immature Grans, Absolute 04/08/2018 0.05* 0.00 - 0.03 10*3/mm3 Final   • nRBC 04/08/2018 0.0  0.0 - 0.0 /100 WBC Final       Results for orders placed or performed in visit on 04/10/18   POC Urinalysis Dipstick, Automated   Result Value Ref Range    Color Yellow Yellow, Straw, Dark Yellow, Cecelia    Clarity, UA Clear Clear    Glucose, UA Negative Negative, 1000 mg/dL (3+) mg/dL    Bilirubin Negative Negative    Ketones, UA Negative Negative    Specific Gravity  1.020 1.005 - 1.030    Blood, UA Trace (A) Negative    pH, Urine 7.0 5.0 - 8.0    Protein, POC Negative Negative mg/dL    Urobilinogen, UA Normal Normal    Leukocytes Negative Negative    Nitrite, UA Negative Negative     Assessment and Plan    Earl was seen today for flank pain.    Diagnoses and all orders for this visit:    Flank pain  -     POC Urinalysis Dipstick, Automated    Right ureteral stone  -     US Renal Bilateral;  Future    History of kidney stones    I reviewed his records from the emergency room.  Urinalysis negative nitrite negative leuk esterase, creatinine 1.48, white blood cell count 11.3.  I independently reviewed his CT scan.  This did show a 1-2 mm distal right stone.    His symptoms have significantly improved.  I do believe he likely passed this 1-2 mm distal right stone.  However, I would like to get a renal ultrasound next week to ensure that there is resolution of the right-sided hydronephrosis.  He understands to come back to my office with fevers chills nausea or vomiting.  He will continue his Flomax with this trial passage.  He did have a stone approximately 2 weeks ago on the left side in the distal ureter.  This was 4 mm in size and it past this.  I do think its important given his young age do a full metabolic workup once he has cleared from this stone episode in order to help prevent future stone episodes.  He expressed understanding and will see him back next week with a renal ultrasound.    CT independent review  The CT scan of the abdomen/pelvis done without contrast is available for me to review.  Treatment recommendations require an independent review.  First I scanned the liver, spleen, and bowel pattern.  The retroperitoneum including the major vessels and lymphatic packages are briefly reviewed.  This film as been reviewed by the radiologist to determine any non urologic abnormalities that are present.  The kidneys are closely inspected for size, symmetry, contour, parenchymal thickness, perinephric reaction, presence of calcifications, and intrarenal dilation of the collecting system.  The ureters are inspected for their course, caliber, and any calcifications.  The bladder is inspected for its thickness, size, and presence of any calcifications.  This scan shows:    The right kidney appears abnormal on this non contrasted CT scan.   Hydronephrosis and hydroureter to the level of a 1 mm distal  stone    The left kidney appears normal on this non-contrasted CT scan.  The renal parenchymal is norml in thickness.  There are no solid masses or cysts.  There is no hydronephrosis.  There are no stones.      The bladder appears normal on this non-contrasted CT scan.  The bladder appears normal in thickness.  There no masses or stones seen on this exam.

## 2018-04-10 NOTE — PATIENT INSTRUCTIONS

## 2018-04-17 ENCOUNTER — HOSPITAL ENCOUNTER (OUTPATIENT)
Dept: ULTRASOUND IMAGING | Facility: HOSPITAL | Age: 23
Discharge: HOME | End: 2018-04-17
Attending: UROLOGY | Admitting: UROLOGY

## 2018-04-17 ENCOUNTER — OFFICE VISIT (OUTPATIENT)
Dept: UROLOGY | Facility: CLINIC | Age: 23
End: 2018-04-17

## 2018-04-17 VITALS
DIASTOLIC BLOOD PRESSURE: 74 MMHG | BODY MASS INDEX: 28.06 KG/M2 | SYSTOLIC BLOOD PRESSURE: 118 MMHG | WEIGHT: 196 LBS | HEIGHT: 70 IN | TEMPERATURE: 98.6 F

## 2018-04-17 DIAGNOSIS — N20.1 RIGHT URETERAL STONE: Primary | ICD-10-CM

## 2018-04-17 DIAGNOSIS — Z87.442 HISTORY OF KIDNEY STONES: ICD-10-CM

## 2018-04-17 DIAGNOSIS — N20.1 RIGHT URETERAL STONE: ICD-10-CM

## 2018-04-17 LAB
BILIRUB BLD-MCNC: NEGATIVE MG/DL
CLARITY, POC: CLEAR
COLOR UR: YELLOW
GLUCOSE UR STRIP-MCNC: NEGATIVE MG/DL
KETONES UR QL: NEGATIVE
LEUKOCYTE EST, POC: NEGATIVE
NITRITE UR-MCNC: NEGATIVE MG/ML
PH UR: 8.5 [PH] (ref 5–8)
PROT UR STRIP-MCNC: NEGATIVE MG/DL
RBC # UR STRIP: NEGATIVE /UL
SP GR UR: 1.02 (ref 1–1.03)
UROBILINOGEN UR QL: NORMAL

## 2018-04-17 PROCEDURE — 99213 OFFICE O/P EST LOW 20 MIN: CPT | Performed by: UROLOGY

## 2018-04-17 PROCEDURE — 76775 US EXAM ABDO BACK WALL LIM: CPT

## 2018-04-17 PROCEDURE — 81001 URINALYSIS AUTO W/SCOPE: CPT | Performed by: UROLOGY

## 2018-04-17 NOTE — PROGRESS NOTES
Mr. Lane is 22 y.o. male    Chief Complaint   Patient presents with   • Nephrolithiasis       History of Present Illness  Recent stone episode  The context of today's visit is following a recent stone episode The patient has passed the stone or fragments of it. The stone location is in the Distal ureter. The onset of this was acute. The course is improving with this management. Associated symptom(s) can be described by resolution after passing stone. The patient did get films to review today.     The following portions of the patient's history were reviewed and updated as appropriate: allergies, current medications, past family history, past medical history, past social history, past surgical history and problem list.    Review of Systems   Constitutional: Negative for chills and fever.   Gastrointestinal: Negative for abdominal pain, anal bleeding and blood in stool.   Genitourinary: Negative for difficulty urinating, flank pain, frequency, hematuria and urgency.         Current Outpatient Prescriptions:   •  atorvastatin (LIPITOR) 20 MG tablet, Take 20 mg by mouth Daily., Disp: , Rfl:   •  ketorolac (TORADOL) 10 MG tablet, Take 1 tablet by mouth Every 6 (Six) Hours As Needed for Moderate Pain ., Disp: 15 tablet, Rfl: 0  •  raNITIdine (ZANTAC) 150 MG tablet, Take 150 mg by mouth 2 (Two) Times a Day., Disp: , Rfl:   •  tamsulosin (FLOMAX) 0.4 MG capsule 24 hr capsule, Take 1 capsule by mouth Daily., Disp: 30 capsule, Rfl: 0    Past Medical History:   Diagnosis Date   • Epistaxis    • High cholesterol    • URI (upper respiratory infection)        History reviewed. No pertinent surgical history.    Social History     Social History   • Marital status:      Social History Main Topics   • Smoking status: Never Smoker   • Smokeless tobacco: Never Used   • Alcohol use Yes      Comment: Socially   • Drug use: No   • Sexual activity: Defer     Other Topics Concern   • Not on file       Family History   Problem  "Relation Age of Onset   • No Known Problems Father    • No Known Problems Mother        Objective    /74   Temp 98.6 °F (37 °C)   Ht 177.8 cm (70\")   Wt 88.9 kg (196 lb)   BMI 28.12 kg/m²     Physical Exam    Office Visit on 04/10/2018   Component Date Value Ref Range Status   • Color 04/10/2018 Yellow  Yellow, Straw, Dark Yellow, Cecelia Final   • Clarity, UA 04/10/2018 Clear  Clear Final   • Glucose, UA 04/10/2018 Negative  Negative, 1000 mg/dL (3+) mg/dL Final   • Bilirubin 04/10/2018 Negative  Negative Final   • Ketones, UA 04/10/2018 Negative  Negative Final   • Specific Gravity  04/10/2018 1.020  1.005 - 1.030 Final   • Blood, UA 04/10/2018 Trace* Negative Final   • pH, Urine 04/10/2018 7.0  5.0 - 8.0 Final   • Protein, POC 04/10/2018 Negative  Negative mg/dL Final   • Urobilinogen, UA 04/10/2018 Normal  Normal Final   • Leukocytes 04/10/2018 Negative  Negative Final   • Nitrite, UA 04/10/2018 Negative  Negative Final       Results for orders placed or performed in visit on 04/17/18   POC Urinalysis Dipstick, Automated   Result Value Ref Range    Color Yellow Yellow, Straw, Dark Yellow, Cecelia    Clarity, UA Clear Clear    Glucose, UA Negative Negative, 1000 mg/dL (3+) mg/dL    Bilirubin Negative Negative    Ketones, UA Negative Negative    Specific Gravity  1.025 1.005 - 1.030    Blood, UA Negative Negative    pH, Urine 8.5 (A) 5.0 - 8.0    Protein, POC Negative Negative mg/dL    Urobilinogen, UA Normal Normal    Leukocytes Negative Negative    Nitrite, UA Negative Negative     Assessment and Plan    Earl was seen today for nephrolithiasis.    Diagnoses and all orders for this visit:    Right ureteral stone  -     POC Urinalysis Dipstick, Automated    History of kidney stones  -     UroStone Max24 - Urine, Clean Catch; Future  -     PTH, Intact & Calcium  -     Uric Acid    Renal ultrasound reviewed.  No hydronephrosis.  He has likely passed a stone.  Given the fact that he has had multiple stones in " a short period of time at his young age I think he needs a full metabolic workup.  I have ordered the labs above as well as a 24-hour urine.  I will see him back with the results.    Renal ultrasound independent review    The renal ultrasound is available for me to review.  Treatment recommendations require an independent review.  This film has been reviewed by the radiologist to determine any non urologic abnormalities that are presents.  However, I very closely inspected the kidneys for size, symmetry, contour, parenchymal thickness, perinephric reaction, presence of calcifications, and intrarenal dilation of the collecting system.       The right kidney appears normal on this ultrasound.  The renal parenchymal is norml in thickness.  There are no solid masses or cysts.  There is no hydronephrosis.  There are no stones.      The left kidney appears normal on this ultrasound.  The renal parenchymal is norml in thickness.  There are no solid masses or cysts.  There is no hydronephrosis.  There are no stones.      The bladder appears normal on thisultrsaound.  The bladder appears normal in thickness.  There no masses or stones seen on this exam.

## 2018-04-17 NOTE — PATIENT INSTRUCTIONS

## 2018-04-18 LAB
CALCIUM SERPL-MCNC: 9.6 MG/DL (ref 8.7–10.2)
INTACT PTH: NORMAL
PTH-INTACT SERPL-MCNC: 24 PG/ML (ref 15–65)
URATE SERPL-MCNC: 4.4 MG/DL (ref 3.5–8.5)

## 2018-04-22 ENCOUNTER — RESULTS ENCOUNTER (OUTPATIENT)
Dept: UROLOGY | Facility: CLINIC | Age: 23
End: 2018-04-22

## 2018-04-22 DIAGNOSIS — Z87.442 HISTORY OF KIDNEY STONES: ICD-10-CM

## 2018-05-09 ENCOUNTER — TELEPHONE (OUTPATIENT)
Dept: UROLOGY | Facility: CLINIC | Age: 23
End: 2018-05-09

## 2018-05-09 NOTE — TELEPHONE ENCOUNTER
Called pt to see if his 24 hr urine has been done. No answer left a voicemail for the pt to call me back.

## 2019-10-10 NOTE — ED NOTES
"ED Call Back Questions    1. How are you doing since leaving the Emergency Department?    Better.  Great ER visit.  2. Do you have any questions about your discharge instructions? No     3. Have you filled your new prescriptions yet? Yes   a. Do you have any questions about those medications? No     4. Were you able to make a follow-up appointment with the physician? No     5. Do you have a primary care physician? Yes   a. If No, would you like for me to set you up with one? N/A  i. If Yes, “I will have our ED  give you a call right back at this number to work with you on the best time for an appointment.”    6. We are always looking to get better at what we do. Do you have any suggestions for what we can do to be even better? No   a. If Yes, \"Thank you for sharing your concerns. I apologize. I will follow up with our manager and patient . Would you like someone to call you back?\" N/A    7. Is there anything else I can do for you? No     " Statement Selected

## 2019-10-12 ENCOUNTER — HOSPITAL ENCOUNTER (EMERGENCY)
Facility: HOSPITAL | Age: 24
Discharge: HOME | End: 2019-10-12
Admitting: EMERGENCY MEDICINE

## 2019-10-12 ENCOUNTER — APPOINTMENT (OUTPATIENT)
Dept: CT IMAGING | Facility: HOSPITAL | Age: 24
End: 2019-10-12

## 2019-10-12 VITALS
BODY MASS INDEX: 27.49 KG/M2 | RESPIRATION RATE: 16 BRPM | TEMPERATURE: 98.2 F | SYSTOLIC BLOOD PRESSURE: 151 MMHG | WEIGHT: 192 LBS | HEART RATE: 74 BPM | HEIGHT: 70 IN | OXYGEN SATURATION: 98 % | DIASTOLIC BLOOD PRESSURE: 93 MMHG

## 2019-10-12 DIAGNOSIS — N20.1 URETEROLITHIASIS: Primary | ICD-10-CM

## 2019-10-12 LAB
ALBUMIN SERPL-MCNC: 4.6 G/DL (ref 3.5–5.2)
ALBUMIN/GLOB SERPL: 1.5 G/DL
ALP SERPL-CCNC: 104 U/L (ref 39–117)
ALT SERPL W P-5'-P-CCNC: 29 U/L (ref 1–41)
ANION GAP SERPL CALCULATED.3IONS-SCNC: 13 MMOL/L (ref 5–15)
AST SERPL-CCNC: 25 U/L (ref 1–40)
BACTERIA UR QL AUTO: ABNORMAL /HPF
BASOPHILS # BLD AUTO: 0.02 10*3/MM3 (ref 0–0.2)
BASOPHILS NFR BLD AUTO: 0.2 % (ref 0–1.5)
BILIRUB SERPL-MCNC: 1.3 MG/DL (ref 0.2–1.2)
BILIRUB UR QL STRIP: NEGATIVE
BUN BLD-MCNC: 22 MG/DL (ref 6–20)
BUN/CREAT SERPL: 15 (ref 7–25)
CALCIUM SPEC-SCNC: 9.3 MG/DL (ref 8.6–10.5)
CHLORIDE SERPL-SCNC: 99 MMOL/L (ref 98–107)
CLARITY UR: CLEAR
CO2 SERPL-SCNC: 27 MMOL/L (ref 22–29)
COLOR UR: YELLOW
CREAT BLD-MCNC: 1.47 MG/DL (ref 0.76–1.27)
DEPRECATED RDW RBC AUTO: 35.7 FL (ref 37–54)
EOSINOPHIL # BLD AUTO: 0.03 10*3/MM3 (ref 0–0.4)
EOSINOPHIL NFR BLD AUTO: 0.4 % (ref 0.3–6.2)
ERYTHROCYTE [DISTWIDTH] IN BLOOD BY AUTOMATED COUNT: 11.9 % (ref 12.3–15.4)
GFR SERPL CREATININE-BSD FRML MDRD: 59 ML/MIN/1.73
GLOBULIN UR ELPH-MCNC: 3.1 GM/DL
GLUCOSE BLD-MCNC: 90 MG/DL (ref 65–99)
GLUCOSE UR STRIP-MCNC: NEGATIVE MG/DL
HCT VFR BLD AUTO: 42.5 % (ref 37.5–51)
HGB BLD-MCNC: 15.2 G/DL (ref 13–17.7)
HGB UR QL STRIP.AUTO: ABNORMAL
HOLD SPECIMEN: NORMAL
HYALINE CASTS UR QL AUTO: ABNORMAL /LPF
IMM GRANULOCYTES # BLD AUTO: 0.02 10*3/MM3 (ref 0–0.05)
IMM GRANULOCYTES NFR BLD AUTO: 0.2 % (ref 0–0.5)
KETONES UR QL STRIP: NEGATIVE
LEUKOCYTE ESTERASE UR QL STRIP.AUTO: NEGATIVE
LIPASE SERPL-CCNC: 12 U/L (ref 13–60)
LYMPHOCYTES # BLD AUTO: 1.41 10*3/MM3 (ref 0.7–3.1)
LYMPHOCYTES NFR BLD AUTO: 16.5 % (ref 19.6–45.3)
MCH RBC QN AUTO: 30.2 PG (ref 26.6–33)
MCHC RBC AUTO-ENTMCNC: 35.8 G/DL (ref 31.5–35.7)
MCV RBC AUTO: 84.5 FL (ref 79–97)
MONOCYTES # BLD AUTO: 1.01 10*3/MM3 (ref 0.1–0.9)
MONOCYTES NFR BLD AUTO: 11.8 % (ref 5–12)
NEUTROPHILS # BLD AUTO: 6.07 10*3/MM3 (ref 1.7–7)
NEUTROPHILS NFR BLD AUTO: 70.9 % (ref 42.7–76)
NITRITE UR QL STRIP: NEGATIVE
NRBC BLD AUTO-RTO: 0 /100 WBC (ref 0–0.2)
PH UR STRIP.AUTO: 5.5 [PH] (ref 5–8)
PLATELET # BLD AUTO: 247 10*3/MM3 (ref 140–450)
PMV BLD AUTO: 10 FL (ref 6–12)
POTASSIUM BLD-SCNC: 4.1 MMOL/L (ref 3.5–5.2)
PROT SERPL-MCNC: 7.7 G/DL (ref 6–8.5)
PROT UR QL STRIP: ABNORMAL
RBC # BLD AUTO: 5.03 10*6/MM3 (ref 4.14–5.8)
RBC # UR: ABNORMAL /HPF
REF LAB TEST METHOD: ABNORMAL
SODIUM BLD-SCNC: 139 MMOL/L (ref 136–145)
SP GR UR STRIP: >=1.03 (ref 1–1.03)
SQUAMOUS #/AREA URNS HPF: ABNORMAL /HPF
UROBILINOGEN UR QL STRIP: ABNORMAL
WBC NRBC COR # BLD: 8.56 10*3/MM3 (ref 3.4–10.8)
WBC UR QL AUTO: ABNORMAL /HPF
WHOLE BLOOD HOLD SPECIMEN: NORMAL
WHOLE BLOOD HOLD SPECIMEN: NORMAL

## 2019-10-12 PROCEDURE — 96376 TX/PRO/DX INJ SAME DRUG ADON: CPT

## 2019-10-12 PROCEDURE — 99283 EMERGENCY DEPT VISIT LOW MDM: CPT

## 2019-10-12 PROCEDURE — 74176 CT ABD & PELVIS W/O CONTRAST: CPT

## 2019-10-12 PROCEDURE — 85025 COMPLETE CBC W/AUTO DIFF WBC: CPT | Performed by: PHYSICIAN ASSISTANT

## 2019-10-12 PROCEDURE — 80053 COMPREHEN METABOLIC PANEL: CPT | Performed by: PHYSICIAN ASSISTANT

## 2019-10-12 PROCEDURE — 25010000002 KETOROLAC TROMETHAMINE PER 15 MG: Performed by: PHYSICIAN ASSISTANT

## 2019-10-12 PROCEDURE — 83690 ASSAY OF LIPASE: CPT | Performed by: PHYSICIAN ASSISTANT

## 2019-10-12 PROCEDURE — 25010000002 MORPHINE PER 10 MG: Performed by: EMERGENCY MEDICINE

## 2019-10-12 PROCEDURE — 96374 THER/PROPH/DIAG INJ IV PUSH: CPT

## 2019-10-12 PROCEDURE — 25010000002 ONDANSETRON PER 1 MG: Performed by: EMERGENCY MEDICINE

## 2019-10-12 PROCEDURE — 81001 URINALYSIS AUTO W/SCOPE: CPT | Performed by: PHYSICIAN ASSISTANT

## 2019-10-12 PROCEDURE — 96375 TX/PRO/DX INJ NEW DRUG ADDON: CPT

## 2019-10-12 PROCEDURE — 25010000002 ONDANSETRON PER 1 MG: Performed by: PHYSICIAN ASSISTANT

## 2019-10-12 RX ORDER — ONDANSETRON 2 MG/ML
4 INJECTION INTRAMUSCULAR; INTRAVENOUS ONCE
Status: COMPLETED | OUTPATIENT
Start: 2019-10-12 | End: 2019-10-12

## 2019-10-12 RX ORDER — HYDROCODONE BITARTRATE AND ACETAMINOPHEN 7.5; 325 MG/1; MG/1
1 TABLET ORAL EVERY 8 HOURS PRN
Qty: 12 TABLET | Refills: 0 | Status: SHIPPED | OUTPATIENT
Start: 2019-10-12 | End: 2019-12-09

## 2019-10-12 RX ORDER — KETOROLAC TROMETHAMINE 15 MG/ML
15 INJECTION, SOLUTION INTRAMUSCULAR; INTRAVENOUS ONCE
Status: COMPLETED | OUTPATIENT
Start: 2019-10-12 | End: 2019-10-12

## 2019-10-12 RX ORDER — ONDANSETRON 4 MG/1
4 TABLET, FILM COATED ORAL EVERY 8 HOURS PRN
Qty: 12 TABLET | Refills: 0 | Status: SHIPPED | OUTPATIENT
Start: 2019-10-12 | End: 2022-12-07

## 2019-10-12 RX ORDER — TAMSULOSIN HYDROCHLORIDE 0.4 MG/1
1 CAPSULE ORAL DAILY
Qty: 5 CAPSULE | Refills: 0 | Status: SHIPPED | OUTPATIENT
Start: 2019-10-12 | End: 2019-12-09

## 2019-10-12 RX ADMIN — KETOROLAC TROMETHAMINE 15 MG: 15 INJECTION, SOLUTION INTRAMUSCULAR; INTRAVENOUS at 11:00

## 2019-10-12 RX ADMIN — SODIUM CHLORIDE 1000 ML: 9 INJECTION, SOLUTION INTRAVENOUS at 10:59

## 2019-10-12 RX ADMIN — MORPHINE SULFATE 4 MG: 4 INJECTION, SOLUTION INTRAMUSCULAR; INTRAVENOUS at 12:02

## 2019-10-12 RX ADMIN — ONDANSETRON HYDROCHLORIDE 4 MG: 2 SOLUTION INTRAMUSCULAR; INTRAVENOUS at 11:01

## 2019-10-12 RX ADMIN — ONDANSETRON HYDROCHLORIDE 4 MG: 2 SOLUTION INTRAMUSCULAR; INTRAVENOUS at 12:02

## 2019-10-12 NOTE — ED PROVIDER NOTES
Subjective   History of Present Illness  24-year-old male presents with a chief complaint of right flank pain.  Patient reports symptoms have been present since last Tuesday.  Feels like sharp stabbing pain.  Patient reports he is having pain to the tip of his penis as well as his right testicle.  He says this does feel like his typical kidney stone.  He said multiple kidney stones in the past.  He denies fevers nausea vomiting diarrhea cough chest pain or shortness of breath.  Review of Systems   All other systems reviewed and are negative.      Past Medical History:   Diagnosis Date   • Epistaxis    • High cholesterol    • Kidney stone    • URI (upper respiratory infection)        No Known Allergies    History reviewed. No pertinent surgical history.    Family History   Problem Relation Age of Onset   • No Known Problems Father    • No Known Problems Mother        Social History     Socioeconomic History   • Marital status:      Spouse name: Not on file   • Number of children: Not on file   • Years of education: Not on file   • Highest education level: Not on file   Tobacco Use   • Smoking status: Never Smoker   • Smokeless tobacco: Never Used   Substance and Sexual Activity   • Alcohol use: Yes     Comment: Socially   • Drug use: No   • Sexual activity: Defer           Objective   Physical Exam   Constitutional: He is oriented to person, place, and time. He appears well-developed and well-nourished.   HENT:   Head: Normocephalic and atraumatic.   Eyes: EOM are normal. Pupils are equal, round, and reactive to light.   Neck: Normal range of motion. Neck supple.   Cardiovascular: Normal rate and regular rhythm.   Pulmonary/Chest: Effort normal and breath sounds normal.   Abdominal: Soft. Bowel sounds are normal. He exhibits no distension. There is no tenderness.   Genitourinary: Penis normal.   Genitourinary Comments: Testicles non-tender bilaterally   Musculoskeletal: Normal range of motion.   Neurological:  He is alert and oriented to person, place, and time. No cranial nerve deficit. Coordination normal.   Skin: Skin is warm. Capillary refill takes less than 2 seconds.   Psychiatric: He has a normal mood and affect. His behavior is normal.   Nursing note and vitals reviewed.      Procedures           ED Course                  MDM  Number of Diagnoses or Management Options  Ureterolithiasis: new and requires workup  Diagnosis management comments: 6mm obstructing uvj stone, will dc with urology follow up     ekasper #[id number]        Amount and/or Complexity of Data Reviewed  Clinical lab tests: reviewed and ordered  Tests in the radiology section of CPT®: ordered and reviewed  Tests in the medicine section of CPT®: ordered and reviewed  Decide to obtain previous medical records or to obtain history from someone other than the patient: yes    Risk of Complications, Morbidity, and/or Mortality  Presenting problems: moderate  Diagnostic procedures: moderate  Management options: moderate    Patient Progress  Patient progress: stable      Final diagnoses:   Ureterolithiasis              Robert Vergara PA-C  10/12/19 1219

## 2019-10-14 ENCOUNTER — TELEPHONE (OUTPATIENT)
Dept: UROLOGY | Facility: CLINIC | Age: 24
End: 2019-10-14

## 2019-10-14 ENCOUNTER — HOSPITAL ENCOUNTER (OUTPATIENT)
Dept: GENERAL RADIOLOGY | Facility: HOSPITAL | Age: 24
Discharge: HOME | End: 2019-10-14
Admitting: NURSE PRACTITIONER

## 2019-10-14 ENCOUNTER — OFFICE VISIT (OUTPATIENT)
Dept: UROLOGY | Facility: CLINIC | Age: 24
End: 2019-10-14

## 2019-10-14 VITALS
HEIGHT: 70 IN | DIASTOLIC BLOOD PRESSURE: 83 MMHG | SYSTOLIC BLOOD PRESSURE: 148 MMHG | RESPIRATION RATE: 16 BRPM | BODY MASS INDEX: 27.49 KG/M2 | WEIGHT: 192 LBS | TEMPERATURE: 98.6 F

## 2019-10-14 DIAGNOSIS — Z87.442 HISTORY OF KIDNEY STONES: ICD-10-CM

## 2019-10-14 DIAGNOSIS — N20.1 RIGHT URETERAL STONE: Primary | ICD-10-CM

## 2019-10-14 DIAGNOSIS — R10.9 FLANK PAIN: ICD-10-CM

## 2019-10-14 DIAGNOSIS — Z87.442 HISTORY OF KIDNEY STONES: Primary | ICD-10-CM

## 2019-10-14 LAB
BILIRUB BLD-MCNC: NEGATIVE MG/DL
CLARITY, POC: CLEAR
COLOR UR: YELLOW
GLUCOSE UR STRIP-MCNC: NEGATIVE MG/DL
KETONES UR QL: NEGATIVE
LEUKOCYTE EST, POC: NEGATIVE
NITRITE UR-MCNC: NEGATIVE MG/ML
PH UR: 7 [PH] (ref 5–8)
PROT UR STRIP-MCNC: NEGATIVE MG/DL
RBC # UR STRIP: ABNORMAL /UL
SP GR UR: 1.01 (ref 1–1.03)
UROBILINOGEN UR QL: NORMAL

## 2019-10-14 PROCEDURE — 74018 RADEX ABDOMEN 1 VIEW: CPT

## 2019-10-14 PROCEDURE — 81001 URINALYSIS AUTO W/SCOPE: CPT | Performed by: NURSE PRACTITIONER

## 2019-10-14 PROCEDURE — 99214 OFFICE O/P EST MOD 30 MIN: CPT | Performed by: NURSE PRACTITIONER

## 2019-10-14 RX ORDER — SODIUM CHLORIDE 9 MG/ML
100 INJECTION, SOLUTION INTRAVENOUS CONTINUOUS
Status: CANCELLED | OUTPATIENT
Start: 2019-10-14

## 2019-10-14 NOTE — H&P (VIEW-ONLY)
Mr. Lane is 24 y.o. male    Chief Complaint   Patient presents with   • Flank Pain     Right side.  KUB done today.  CT done Saturday.     History of Present Illness  Patient presents with complaints of right-sided flank abdominal pain.  He reports the symptoms began last Tuesday and worsened until he presented to the ER on Saturday.  The symptoms are moderate to severe in nature.  Associated symptoms include dysuria, nausea and vomiting which patient attributes to pain medication.  He denies any fevers, hematuria.  He has been taking Toradol, Zofran, Flomax with little relief of symptoms.  He is a recurrent stone former his most recent stone episode was April 2018.    The following portions of the patient's history were reviewed and updated as appropriate: allergies, current medications, past family history, past medical history, past social history, past surgical history and problem list.    Review of Systems   Constitutional: Positive for chills. Negative for fever.   Gastrointestinal: Positive for nausea. Negative for abdominal distention, abdominal pain, blood in stool and vomiting.   Genitourinary: Positive for dysuria and flank pain (Right side). Negative for difficulty urinating, frequency, hematuria and urgency.   Psychiatric/Behavioral: Negative.  Negative for agitation and confusion.       Current Outpatient Medications:   •  HYDROcodone-acetaminophen (NORCO) 7.5-325 MG per tablet, Take 1 tablet by mouth Every 8 (Eight) Hours As Needed for Moderate Pain ., Disp: 12 tablet, Rfl: 0  •  ondansetron (ZOFRAN) 4 MG tablet, Take 1 tablet by mouth Every 8 (Eight) Hours As Needed for Nausea., Disp: 12 tablet, Rfl: 0  •  raNITIdine HCl (ZANTAC PO), Take  by mouth Daily., Disp: , Rfl:   •  tamsulosin (FLOMAX) 0.4 MG capsule 24 hr capsule, Take 1 capsule by mouth Daily., Disp: 5 capsule, Rfl: 0    Past Medical History:   Diagnosis Date   • Epistaxis    • High cholesterol    • Kidney stone    • URI (upper respiratory  "infection)        Past Surgical History:   Procedure Laterality Date   • NO PAST SURGERIES         Social History     Socioeconomic History   • Marital status:      Spouse name: Not on file   • Number of children: Not on file   • Years of education: Not on file   • Highest education level: Not on file   Tobacco Use   • Smoking status: Never Smoker   • Smokeless tobacco: Never Used   Substance and Sexual Activity   • Alcohol use: Yes     Comment: Socially   • Drug use: No   • Sexual activity: Defer       Family History   Problem Relation Age of Onset   • No Known Problems Father    • No Known Problems Mother      Objective    /83   Temp 98.6 °F (37 °C)   Resp 16   Ht 177.8 cm (70\")   Wt 87.1 kg (192 lb)   BMI 27.55 kg/m²     Physical Exam   Constitutional: He is oriented to person, place, and time. He appears well-developed and well-nourished.   HENT:   Head: Normocephalic.   Pulmonary/Chest: Effort normal. No respiratory distress.   Abdominal: He exhibits no distension.   Musculoskeletal: He exhibits no edema.   Neurological: He is alert and oriented to person, place, and time.   Skin: Skin is warm and dry.   Psychiatric: He has a normal mood and affect. His behavior is normal.   Vitals reviewed.    Patient's Body mass index is 27.55 kg/m². BMI is above normal parameters. Recommendations include: educational material.      Admission on 10/12/2019, Discharged on 10/12/2019   Component Date Value Ref Range Status   • Glucose 10/12/2019 90  65 - 99 mg/dL Final   • BUN 10/12/2019 22* 6 - 20 mg/dL Final   • Creatinine 10/12/2019 1.47* 0.76 - 1.27 mg/dL Final   • Sodium 10/12/2019 139  136 - 145 mmol/L Final   • Potassium 10/12/2019 4.1  3.5 - 5.2 mmol/L Final   • Chloride 10/12/2019 99  98 - 107 mmol/L Final   • CO2 10/12/2019 27.0  22.0 - 29.0 mmol/L Final   • Calcium 10/12/2019 9.3  8.6 - 10.5 mg/dL Final   • Total Protein 10/12/2019 7.7  6.0 - 8.5 g/dL Final   • Albumin 10/12/2019 4.60  3.50 - 5.20 " g/dL Final   • ALT (SGPT) 10/12/2019 29  1 - 41 U/L Final   • AST (SGOT) 10/12/2019 25  1 - 40 U/L Final   • Alkaline Phosphatase 10/12/2019 104  39 - 117 U/L Final   • Total Bilirubin 10/12/2019 1.3* 0.2 - 1.2 mg/dL Final   • eGFR Non African Amer 10/12/2019 59* >60 mL/min/1.73 Final   • Globulin 10/12/2019 3.1  gm/dL Final   • A/G Ratio 10/12/2019 1.5  g/dL Final   • BUN/Creatinine Ratio 10/12/2019 15.0  7.0 - 25.0 Final   • Anion Gap 10/12/2019 13.0  5.0 - 15.0 mmol/L Final   • Color, UA 10/12/2019 Yellow  Yellow, Straw Final   • Appearance, UA 10/12/2019 Clear  Clear Final   • pH, UA 10/12/2019 5.5  5.0 - 8.0 Final   • Specific Gravity, UA 10/12/2019 >=1.030  1.005 - 1.030 Final   • Glucose, UA 10/12/2019 Negative  Negative Final   • Ketones, UA 10/12/2019 Negative  Negative Final   • Bilirubin, UA 10/12/2019 Negative  Negative Final   • Blood, UA 10/12/2019 Small (1+)* Negative Final   • Protein, UA 10/12/2019 30 mg/dL (1+)* Negative Final   • Leuk Esterase, UA 10/12/2019 Negative  Negative Final   • Nitrite, UA 10/12/2019 Negative  Negative Final   • Urobilinogen, UA 10/12/2019 0.2 E.U./dL  0.2 - 1.0 E.U./dL Final   • WBC 10/12/2019 8.56  3.40 - 10.80 10*3/mm3 Final   • RBC 10/12/2019 5.03  4.14 - 5.80 10*6/mm3 Final   • Hemoglobin 10/12/2019 15.2  13.0 - 17.7 g/dL Final   • Hematocrit 10/12/2019 42.5  37.5 - 51.0 % Final   • MCV 10/12/2019 84.5  79.0 - 97.0 fL Final   • MCH 10/12/2019 30.2  26.6 - 33.0 pg Final   • MCHC 10/12/2019 35.8* 31.5 - 35.7 g/dL Final   • RDW 10/12/2019 11.9* 12.3 - 15.4 % Final   • RDW-SD 10/12/2019 35.7* 37.0 - 54.0 fl Final   • MPV 10/12/2019 10.0  6.0 - 12.0 fL Final   • Platelets 10/12/2019 247  140 - 450 10*3/mm3 Final   • Neutrophil % 10/12/2019 70.9  42.7 - 76.0 % Final   • Lymphocyte % 10/12/2019 16.5* 19.6 - 45.3 % Final   • Monocyte % 10/12/2019 11.8  5.0 - 12.0 % Final   • Eosinophil % 10/12/2019 0.4  0.3 - 6.2 % Final   • Basophil % 10/12/2019 0.2  0.0 - 1.5 % Final   •  Immature Grans % 10/12/2019 0.2  0.0 - 0.5 % Final   • Neutrophils, Absolute 10/12/2019 6.07  1.70 - 7.00 10*3/mm3 Final   • Lymphocytes, Absolute 10/12/2019 1.41  0.70 - 3.10 10*3/mm3 Final   • Monocytes, Absolute 10/12/2019 1.01* 0.10 - 0.90 10*3/mm3 Final   • Eosinophils, Absolute 10/12/2019 0.03  0.00 - 0.40 10*3/mm3 Final   • Basophils, Absolute 10/12/2019 0.02  0.00 - 0.20 10*3/mm3 Final   • Immature Grans, Absolute 10/12/2019 0.02  0.00 - 0.05 10*3/mm3 Final   • nRBC 10/12/2019 0.0  0.0 - 0.2 /100 WBC Final   • Lipase 10/12/2019 12* 13 - 60 U/L Final   • Extra Tube 10/12/2019 hold for add-on   Final    Auto resulted   • Extra Tube 10/12/2019 Hold for add-ons.   Final    Auto resulted.   • Extra Tube 10/12/2019 hold for add-on   Final    Auto resulted   • RBC, UA 10/12/2019 6-12* None Seen /HPF Final   • WBC, UA 10/12/2019 3-5* None Seen /HPF Final   • Bacteria, UA 10/12/2019 None Seen  None Seen /HPF Final   • Squamous Epithelial Cells, UA 10/12/2019 0-2  None Seen, 0-2 /HPF Final   • Hyaline Casts, UA 10/12/2019 0-2  None Seen /LPF Final   • Methodology 10/12/2019 Automated Microscopy   Final       Results for orders placed or performed in visit on 10/14/19   POC Urinalysis Dipstick, Multipro   Result Value Ref Range    Color Yellow Yellow, Straw, Dark Yellow, Cecelia    Clarity, UA Clear Clear    Glucose, UA Negative Negative, 1000 mg/dL (3+) mg/dL    Bilirubin Negative Negative    Ketones, UA Negative Negative    Specific Gravity  1.010 1.005 - 1.030    Blood, UA Small (A) Negative    pH, Urine 7.0 5.0 - 8.0    Protein, POC Negative Negative mg/dL    Urobilinogen, UA Normal Normal    Nitrite, UA Negative Negative    Leukocytes Negative Negative        Assessment/Plan   Assessment and Plan    Earl was seen today for flank pain.    Diagnoses and all orders for this visit:    Right ureteral stone  -     sodium chloride 0.9 % infusion  -     ceFAZolin (ANCEF) 2 g in sodium chloride 0.9 % 100 mL IVPB  -      Case Request; Standing  -     Case Request  -     POC Urinalysis Dipstick, Multipro    Flank pain    Other orders  -     Follow Anesthesia Guidelines / Standing Orders; Future  -     Obtain informed consent  -     Provide NPO Instructions to Patient; Future  -     Chlorhexidine Skin Prep; Future  -     Follow Anesthesia Guidelines / Standing Orders; Standing  -     Verify NPO Status; Standing  -     SCD (sequential compression device)- to be placed on patient in Pre-op; Standing  -     Verify / Perform Chlorhexidine Skin Prep; Standing  -     Verify / Perform Chlorhexidine Skin Prep if Indicated (If Not Already Completed); Standing    Patient presents with complaints of flank pain, nausea, dysuria.  CT scan from the emergency room on Saturday reveals a 6 mm obstructing stone in the distal right ureter with a suspicious 1 mm stone directly adjacent to this.  While it is difficult to discern, I do believe I can also visualize the stone on KUB.  The patient does not report passing any stone.    The patient has a right distal ureteral calculus as defined by symptoms and radiographic studies.  Options for the management of this stone are discussed based upon size, location, symptoms, and probable composition including watchful waiting, expulsive therapy, ESWL, ureteral stenting, percutaneous management, and open approaches.  Based upon this discussed, The patient has elected to proceed with ureteroscopic management of the stone.  Mr. Lane understands that a laser or other fragmentation aid may be needed.  The need for ureteral stenting and subsequent removal is also discussed.  Risks of bleeding, infection, damage to urethra or bladder, ureteral perforation or avulsion, pain, and post operative stent discomfort are all discussed.  I discussed the need for return follow up for stent removal, and that failure to do so could result in significant infection, further stone formation, need for surgical intervention to remove  the stent, or permanent kidney damage.  All questions were answered to the patient's satisfaction.

## 2019-10-14 NOTE — PROGRESS NOTES
Mr. Lane is 24 y.o. male    Chief Complaint   Patient presents with   • Flank Pain     Right side.  KUB done today.  CT done Saturday.     History of Present Illness  Patient presents with complaints of right-sided flank abdominal pain.  He reports the symptoms began last Tuesday and worsened until he presented to the ER on Saturday.  The symptoms are moderate to severe in nature.  Associated symptoms include dysuria, nausea and vomiting which patient attributes to pain medication.  He denies any fevers, hematuria.  He has been taking Toradol, Zofran, Flomax with little relief of symptoms.  He is a recurrent stone former his most recent stone episode was April 2018.    The following portions of the patient's history were reviewed and updated as appropriate: allergies, current medications, past family history, past medical history, past social history, past surgical history and problem list.    Review of Systems   Constitutional: Positive for chills. Negative for fever.   Gastrointestinal: Positive for nausea. Negative for abdominal distention, abdominal pain, blood in stool and vomiting.   Genitourinary: Positive for dysuria and flank pain (Right side). Negative for difficulty urinating, frequency, hematuria and urgency.   Psychiatric/Behavioral: Negative.  Negative for agitation and confusion.       Current Outpatient Medications:   •  HYDROcodone-acetaminophen (NORCO) 7.5-325 MG per tablet, Take 1 tablet by mouth Every 8 (Eight) Hours As Needed for Moderate Pain ., Disp: 12 tablet, Rfl: 0  •  ondansetron (ZOFRAN) 4 MG tablet, Take 1 tablet by mouth Every 8 (Eight) Hours As Needed for Nausea., Disp: 12 tablet, Rfl: 0  •  raNITIdine HCl (ZANTAC PO), Take  by mouth Daily., Disp: , Rfl:   •  tamsulosin (FLOMAX) 0.4 MG capsule 24 hr capsule, Take 1 capsule by mouth Daily., Disp: 5 capsule, Rfl: 0    Past Medical History:   Diagnosis Date   • Epistaxis    • High cholesterol    • Kidney stone    • URI (upper respiratory  "infection)        Past Surgical History:   Procedure Laterality Date   • NO PAST SURGERIES         Social History     Socioeconomic History   • Marital status:      Spouse name: Not on file   • Number of children: Not on file   • Years of education: Not on file   • Highest education level: Not on file   Tobacco Use   • Smoking status: Never Smoker   • Smokeless tobacco: Never Used   Substance and Sexual Activity   • Alcohol use: Yes     Comment: Socially   • Drug use: No   • Sexual activity: Defer       Family History   Problem Relation Age of Onset   • No Known Problems Father    • No Known Problems Mother      Objective    /83   Temp 98.6 °F (37 °C)   Resp 16   Ht 177.8 cm (70\")   Wt 87.1 kg (192 lb)   BMI 27.55 kg/m²     Physical Exam   Constitutional: He is oriented to person, place, and time. He appears well-developed and well-nourished.   HENT:   Head: Normocephalic.   Pulmonary/Chest: Effort normal. No respiratory distress.   Abdominal: He exhibits no distension.   Musculoskeletal: He exhibits no edema.   Neurological: He is alert and oriented to person, place, and time.   Skin: Skin is warm and dry.   Psychiatric: He has a normal mood and affect. His behavior is normal.   Vitals reviewed.    Patient's Body mass index is 27.55 kg/m². BMI is above normal parameters. Recommendations include: educational material.      Admission on 10/12/2019, Discharged on 10/12/2019   Component Date Value Ref Range Status   • Glucose 10/12/2019 90  65 - 99 mg/dL Final   • BUN 10/12/2019 22* 6 - 20 mg/dL Final   • Creatinine 10/12/2019 1.47* 0.76 - 1.27 mg/dL Final   • Sodium 10/12/2019 139  136 - 145 mmol/L Final   • Potassium 10/12/2019 4.1  3.5 - 5.2 mmol/L Final   • Chloride 10/12/2019 99  98 - 107 mmol/L Final   • CO2 10/12/2019 27.0  22.0 - 29.0 mmol/L Final   • Calcium 10/12/2019 9.3  8.6 - 10.5 mg/dL Final   • Total Protein 10/12/2019 7.7  6.0 - 8.5 g/dL Final   • Albumin 10/12/2019 4.60  3.50 - 5.20 " g/dL Final   • ALT (SGPT) 10/12/2019 29  1 - 41 U/L Final   • AST (SGOT) 10/12/2019 25  1 - 40 U/L Final   • Alkaline Phosphatase 10/12/2019 104  39 - 117 U/L Final   • Total Bilirubin 10/12/2019 1.3* 0.2 - 1.2 mg/dL Final   • eGFR Non African Amer 10/12/2019 59* >60 mL/min/1.73 Final   • Globulin 10/12/2019 3.1  gm/dL Final   • A/G Ratio 10/12/2019 1.5  g/dL Final   • BUN/Creatinine Ratio 10/12/2019 15.0  7.0 - 25.0 Final   • Anion Gap 10/12/2019 13.0  5.0 - 15.0 mmol/L Final   • Color, UA 10/12/2019 Yellow  Yellow, Straw Final   • Appearance, UA 10/12/2019 Clear  Clear Final   • pH, UA 10/12/2019 5.5  5.0 - 8.0 Final   • Specific Gravity, UA 10/12/2019 >=1.030  1.005 - 1.030 Final   • Glucose, UA 10/12/2019 Negative  Negative Final   • Ketones, UA 10/12/2019 Negative  Negative Final   • Bilirubin, UA 10/12/2019 Negative  Negative Final   • Blood, UA 10/12/2019 Small (1+)* Negative Final   • Protein, UA 10/12/2019 30 mg/dL (1+)* Negative Final   • Leuk Esterase, UA 10/12/2019 Negative  Negative Final   • Nitrite, UA 10/12/2019 Negative  Negative Final   • Urobilinogen, UA 10/12/2019 0.2 E.U./dL  0.2 - 1.0 E.U./dL Final   • WBC 10/12/2019 8.56  3.40 - 10.80 10*3/mm3 Final   • RBC 10/12/2019 5.03  4.14 - 5.80 10*6/mm3 Final   • Hemoglobin 10/12/2019 15.2  13.0 - 17.7 g/dL Final   • Hematocrit 10/12/2019 42.5  37.5 - 51.0 % Final   • MCV 10/12/2019 84.5  79.0 - 97.0 fL Final   • MCH 10/12/2019 30.2  26.6 - 33.0 pg Final   • MCHC 10/12/2019 35.8* 31.5 - 35.7 g/dL Final   • RDW 10/12/2019 11.9* 12.3 - 15.4 % Final   • RDW-SD 10/12/2019 35.7* 37.0 - 54.0 fl Final   • MPV 10/12/2019 10.0  6.0 - 12.0 fL Final   • Platelets 10/12/2019 247  140 - 450 10*3/mm3 Final   • Neutrophil % 10/12/2019 70.9  42.7 - 76.0 % Final   • Lymphocyte % 10/12/2019 16.5* 19.6 - 45.3 % Final   • Monocyte % 10/12/2019 11.8  5.0 - 12.0 % Final   • Eosinophil % 10/12/2019 0.4  0.3 - 6.2 % Final   • Basophil % 10/12/2019 0.2  0.0 - 1.5 % Final   •  Immature Grans % 10/12/2019 0.2  0.0 - 0.5 % Final   • Neutrophils, Absolute 10/12/2019 6.07  1.70 - 7.00 10*3/mm3 Final   • Lymphocytes, Absolute 10/12/2019 1.41  0.70 - 3.10 10*3/mm3 Final   • Monocytes, Absolute 10/12/2019 1.01* 0.10 - 0.90 10*3/mm3 Final   • Eosinophils, Absolute 10/12/2019 0.03  0.00 - 0.40 10*3/mm3 Final   • Basophils, Absolute 10/12/2019 0.02  0.00 - 0.20 10*3/mm3 Final   • Immature Grans, Absolute 10/12/2019 0.02  0.00 - 0.05 10*3/mm3 Final   • nRBC 10/12/2019 0.0  0.0 - 0.2 /100 WBC Final   • Lipase 10/12/2019 12* 13 - 60 U/L Final   • Extra Tube 10/12/2019 hold for add-on   Final    Auto resulted   • Extra Tube 10/12/2019 Hold for add-ons.   Final    Auto resulted.   • Extra Tube 10/12/2019 hold for add-on   Final    Auto resulted   • RBC, UA 10/12/2019 6-12* None Seen /HPF Final   • WBC, UA 10/12/2019 3-5* None Seen /HPF Final   • Bacteria, UA 10/12/2019 None Seen  None Seen /HPF Final   • Squamous Epithelial Cells, UA 10/12/2019 0-2  None Seen, 0-2 /HPF Final   • Hyaline Casts, UA 10/12/2019 0-2  None Seen /LPF Final   • Methodology 10/12/2019 Automated Microscopy   Final       Results for orders placed or performed in visit on 10/14/19   POC Urinalysis Dipstick, Multipro   Result Value Ref Range    Color Yellow Yellow, Straw, Dark Yellow, Cecelia    Clarity, UA Clear Clear    Glucose, UA Negative Negative, 1000 mg/dL (3+) mg/dL    Bilirubin Negative Negative    Ketones, UA Negative Negative    Specific Gravity  1.010 1.005 - 1.030    Blood, UA Small (A) Negative    pH, Urine 7.0 5.0 - 8.0    Protein, POC Negative Negative mg/dL    Urobilinogen, UA Normal Normal    Nitrite, UA Negative Negative    Leukocytes Negative Negative        Assessment/Plan   Assessment and Plan    Eral was seen today for flank pain.    Diagnoses and all orders for this visit:    Right ureteral stone  -     sodium chloride 0.9 % infusion  -     ceFAZolin (ANCEF) 2 g in sodium chloride 0.9 % 100 mL IVPB  -      Case Request; Standing  -     Case Request  -     POC Urinalysis Dipstick, Multipro    Flank pain    Other orders  -     Follow Anesthesia Guidelines / Standing Orders; Future  -     Obtain informed consent  -     Provide NPO Instructions to Patient; Future  -     Chlorhexidine Skin Prep; Future  -     Follow Anesthesia Guidelines / Standing Orders; Standing  -     Verify NPO Status; Standing  -     SCD (sequential compression device)- to be placed on patient in Pre-op; Standing  -     Verify / Perform Chlorhexidine Skin Prep; Standing  -     Verify / Perform Chlorhexidine Skin Prep if Indicated (If Not Already Completed); Standing    Patient presents with complaints of flank pain, nausea, dysuria.  CT scan from the emergency room on Saturday reveals a 6 mm obstructing stone in the distal right ureter with a suspicious 1 mm stone directly adjacent to this.  While it is difficult to discern, I do believe I can also visualize the stone on KUB.  The patient does not report passing any stone.    The patient has a right distal ureteral calculus as defined by symptoms and radiographic studies.  Options for the management of this stone are discussed based upon size, location, symptoms, and probable composition including watchful waiting, expulsive therapy, ESWL, ureteral stenting, percutaneous management, and open approaches.  Based upon this discussed, The patient has elected to proceed with ureteroscopic management of the stone.  Mr. Lane understands that a laser or other fragmentation aid may be needed.  The need for ureteral stenting and subsequent removal is also discussed.  Risks of bleeding, infection, damage to urethra or bladder, ureteral perforation or avulsion, pain, and post operative stent discomfort are all discussed.  I discussed the need for return follow up for stent removal, and that failure to do so could result in significant infection, further stone formation, need for surgical intervention to remove  the stent, or permanent kidney damage.  All questions were answered to the patient's satisfaction.

## 2019-10-14 NOTE — PATIENT INSTRUCTIONS

## 2019-10-14 NOTE — TELEPHONE ENCOUNTER
Pt was seen in ER over weekend at [facility] and was instructed to FU with urology for further treatment. Pt had a confirmed R sided ureter 6 mm obstructing stone. Pt is established here and had seen Dr. Tito frankel. KUB order placed and will be seeing Laura today for FU and further instruction.

## 2019-10-16 ENCOUNTER — APPOINTMENT (OUTPATIENT)
Dept: PREADMISSION TESTING | Facility: HOSPITAL | Age: 24
End: 2019-10-16

## 2019-10-16 VITALS
BODY MASS INDEX: 28.73 KG/M2 | WEIGHT: 194 LBS | HEIGHT: 69 IN | DIASTOLIC BLOOD PRESSURE: 75 MMHG | SYSTOLIC BLOOD PRESSURE: 132 MMHG | OXYGEN SATURATION: 98 % | HEART RATE: 57 BPM | RESPIRATION RATE: 16 BRPM

## 2019-10-17 ENCOUNTER — ANESTHESIA (OUTPATIENT)
Dept: PERIOP | Facility: HOSPITAL | Age: 24
End: 2019-10-17

## 2019-10-17 ENCOUNTER — HOSPITAL ENCOUNTER (OUTPATIENT)
Facility: HOSPITAL | Age: 24
Setting detail: HOSPITAL OUTPATIENT SURGERY
Discharge: HOME | End: 2019-10-17
Attending: UROLOGY | Admitting: UROLOGY

## 2019-10-17 ENCOUNTER — ANESTHESIA EVENT (OUTPATIENT)
Dept: PERIOP | Facility: HOSPITAL | Age: 24
End: 2019-10-17

## 2019-10-17 ENCOUNTER — APPOINTMENT (OUTPATIENT)
Dept: GENERAL RADIOLOGY | Facility: HOSPITAL | Age: 24
End: 2019-10-17

## 2019-10-17 VITALS
RESPIRATION RATE: 18 BRPM | DIASTOLIC BLOOD PRESSURE: 82 MMHG | SYSTOLIC BLOOD PRESSURE: 134 MMHG | OXYGEN SATURATION: 100 % | TEMPERATURE: 97.8 F | HEART RATE: 68 BPM

## 2019-10-17 DIAGNOSIS — N20.1 RIGHT URETERAL STONE: ICD-10-CM

## 2019-10-17 PROCEDURE — 74420 UROGRAPHY RTRGR +-KUB: CPT

## 2019-10-17 PROCEDURE — 25010000002 KETOROLAC TROMETHAMINE PER 15 MG: Performed by: NURSE ANESTHETIST, CERTIFIED REGISTERED

## 2019-10-17 PROCEDURE — 25010000002 PROPOFOL 10 MG/ML EMULSION: Performed by: NURSE ANESTHETIST, CERTIFIED REGISTERED

## 2019-10-17 PROCEDURE — 25010000002 IOPAMIDOL 61 % SOLUTION: Performed by: UROLOGY

## 2019-10-17 PROCEDURE — 25010000002 MIDAZOLAM PER 1 MG: Performed by: ANESTHESIOLOGY

## 2019-10-17 PROCEDURE — 25010000002 FENTANYL CITRATE (PF) 100 MCG/2ML SOLUTION: Performed by: NURSE ANESTHETIST, CERTIFIED REGISTERED

## 2019-10-17 PROCEDURE — 82360 CALCULUS ASSAY QUANT: CPT | Performed by: UROLOGY

## 2019-10-17 PROCEDURE — 25010000002 DEXAMETHASONE PER 1 MG: Performed by: NURSE ANESTHETIST, CERTIFIED REGISTERED

## 2019-10-17 PROCEDURE — 88300 SURGICAL PATH GROSS: CPT | Performed by: UROLOGY

## 2019-10-17 PROCEDURE — 52356 CYSTO/URETERO W/LITHOTRIPSY: CPT | Performed by: UROLOGY

## 2019-10-17 PROCEDURE — 25010000002 ONDANSETRON PER 1 MG: Performed by: NURSE ANESTHETIST, CERTIFIED REGISTERED

## 2019-10-17 PROCEDURE — C1769 GUIDE WIRE: HCPCS | Performed by: UROLOGY

## 2019-10-17 PROCEDURE — C2617 STENT, NON-COR, TEM W/O DEL: HCPCS | Performed by: UROLOGY

## 2019-10-17 PROCEDURE — C1758 CATHETER, URETERAL: HCPCS | Performed by: UROLOGY

## 2019-10-17 PROCEDURE — 74420 UROGRAPHY RTRGR +-KUB: CPT | Performed by: UROLOGY

## 2019-10-17 DEVICE — URETERAL STENT
Type: IMPLANTABLE DEVICE | Site: URETER | Status: FUNCTIONAL
Brand: PERCUFLEX™ PLUS

## 2019-10-17 RX ORDER — DEXTROSE MONOHYDRATE 25 G/50ML
12.5 INJECTION, SOLUTION INTRAVENOUS AS NEEDED
Status: DISCONTINUED | OUTPATIENT
Start: 2019-10-17 | End: 2019-10-17 | Stop reason: HOSPADM

## 2019-10-17 RX ORDER — METOCLOPRAMIDE HYDROCHLORIDE 5 MG/ML
5 INJECTION INTRAMUSCULAR; INTRAVENOUS
Status: DISCONTINUED | OUTPATIENT
Start: 2019-10-17 | End: 2019-10-17 | Stop reason: HOSPADM

## 2019-10-17 RX ORDER — LABETALOL HYDROCHLORIDE 5 MG/ML
5 INJECTION, SOLUTION INTRAVENOUS
Status: DISCONTINUED | OUTPATIENT
Start: 2019-10-17 | End: 2019-10-17 | Stop reason: HOSPADM

## 2019-10-17 RX ORDER — FENTANYL CITRATE 50 UG/ML
INJECTION, SOLUTION INTRAMUSCULAR; INTRAVENOUS AS NEEDED
Status: DISCONTINUED | OUTPATIENT
Start: 2019-10-17 | End: 2019-10-17 | Stop reason: SURG

## 2019-10-17 RX ORDER — NALOXONE HCL 0.4 MG/ML
0.4 VIAL (ML) INJECTION AS NEEDED
Status: DISCONTINUED | OUTPATIENT
Start: 2019-10-17 | End: 2019-10-17 | Stop reason: HOSPADM

## 2019-10-17 RX ORDER — SODIUM CHLORIDE, SODIUM LACTATE, POTASSIUM CHLORIDE, CALCIUM CHLORIDE 600; 310; 30; 20 MG/100ML; MG/100ML; MG/100ML; MG/100ML
1000 INJECTION, SOLUTION INTRAVENOUS CONTINUOUS
Status: DISCONTINUED | OUTPATIENT
Start: 2019-10-17 | End: 2019-10-17 | Stop reason: HOSPADM

## 2019-10-17 RX ORDER — ONDANSETRON 2 MG/ML
4 INJECTION INTRAMUSCULAR; INTRAVENOUS ONCE AS NEEDED
Status: DISCONTINUED | OUTPATIENT
Start: 2019-10-17 | End: 2019-10-17 | Stop reason: HOSPADM

## 2019-10-17 RX ORDER — OXYCODONE AND ACETAMINOPHEN 10; 325 MG/1; MG/1
1 TABLET ORAL ONCE AS NEEDED
Status: DISCONTINUED | OUTPATIENT
Start: 2019-10-17 | End: 2019-10-17 | Stop reason: HOSPADM

## 2019-10-17 RX ORDER — OXYCODONE AND ACETAMINOPHEN 7.5; 325 MG/1; MG/1
2 TABLET ORAL EVERY 4 HOURS PRN
Status: DISCONTINUED | OUTPATIENT
Start: 2019-10-17 | End: 2019-10-17 | Stop reason: HOSPADM

## 2019-10-17 RX ORDER — SODIUM CHLORIDE 9 MG/ML
100 INJECTION, SOLUTION INTRAVENOUS CONTINUOUS
Status: DISCONTINUED | OUTPATIENT
Start: 2019-10-17 | End: 2019-10-17 | Stop reason: HOSPADM

## 2019-10-17 RX ORDER — SODIUM CHLORIDE, SODIUM LACTATE, POTASSIUM CHLORIDE, CALCIUM CHLORIDE 600; 310; 30; 20 MG/100ML; MG/100ML; MG/100ML; MG/100ML
9 INJECTION, SOLUTION INTRAVENOUS CONTINUOUS
Status: DISCONTINUED | OUTPATIENT
Start: 2019-10-17 | End: 2019-10-17 | Stop reason: HOSPADM

## 2019-10-17 RX ORDER — ONDANSETRON 2 MG/ML
INJECTION INTRAMUSCULAR; INTRAVENOUS AS NEEDED
Status: DISCONTINUED | OUTPATIENT
Start: 2019-10-17 | End: 2019-10-17 | Stop reason: SURG

## 2019-10-17 RX ORDER — IPRATROPIUM BROMIDE AND ALBUTEROL SULFATE 2.5; .5 MG/3ML; MG/3ML
3 SOLUTION RESPIRATORY (INHALATION) ONCE AS NEEDED
Status: DISCONTINUED | OUTPATIENT
Start: 2019-10-17 | End: 2019-10-17 | Stop reason: HOSPADM

## 2019-10-17 RX ORDER — IBUPROFEN 600 MG/1
600 TABLET ORAL ONCE AS NEEDED
Status: DISCONTINUED | OUTPATIENT
Start: 2019-10-17 | End: 2019-10-17 | Stop reason: HOSPADM

## 2019-10-17 RX ORDER — HYDROCODONE BITARTRATE AND ACETAMINOPHEN 7.5; 325 MG/1; MG/1
1 TABLET ORAL EVERY 4 HOURS PRN
Qty: 12 TABLET | Refills: 0 | Status: SHIPPED | OUTPATIENT
Start: 2019-10-17 | End: 2019-12-09

## 2019-10-17 RX ORDER — KETOROLAC TROMETHAMINE 30 MG/ML
INJECTION, SOLUTION INTRAMUSCULAR; INTRAVENOUS AS NEEDED
Status: DISCONTINUED | OUTPATIENT
Start: 2019-10-17 | End: 2019-10-17 | Stop reason: SURG

## 2019-10-17 RX ORDER — BUPIVACAINE HCL/0.9 % NACL/PF 0.1 %
2 PLASTIC BAG, INJECTION (ML) EPIDURAL ONCE
Status: COMPLETED | OUTPATIENT
Start: 2019-10-17 | End: 2019-10-17

## 2019-10-17 RX ORDER — DEXAMETHASONE SODIUM PHOSPHATE 4 MG/ML
INJECTION, SOLUTION INTRA-ARTICULAR; INTRALESIONAL; INTRAMUSCULAR; INTRAVENOUS; SOFT TISSUE AS NEEDED
Status: DISCONTINUED | OUTPATIENT
Start: 2019-10-17 | End: 2019-10-17 | Stop reason: SURG

## 2019-10-17 RX ORDER — SODIUM CHLORIDE 0.9 % (FLUSH) 0.9 %
3 SYRINGE (ML) INJECTION AS NEEDED
Status: DISCONTINUED | OUTPATIENT
Start: 2019-10-17 | End: 2019-10-17 | Stop reason: HOSPADM

## 2019-10-17 RX ORDER — FENTANYL CITRATE 50 UG/ML
25 INJECTION, SOLUTION INTRAMUSCULAR; INTRAVENOUS
Status: DISCONTINUED | OUTPATIENT
Start: 2019-10-17 | End: 2019-10-17 | Stop reason: HOSPADM

## 2019-10-17 RX ORDER — SODIUM CHLORIDE 0.9 % (FLUSH) 0.9 %
3 SYRINGE (ML) INJECTION EVERY 12 HOURS SCHEDULED
Status: DISCONTINUED | OUTPATIENT
Start: 2019-10-17 | End: 2019-10-17 | Stop reason: HOSPADM

## 2019-10-17 RX ORDER — SODIUM CHLORIDE 0.9 % (FLUSH) 0.9 %
3-10 SYRINGE (ML) INJECTION AS NEEDED
Status: DISCONTINUED | OUTPATIENT
Start: 2019-10-17 | End: 2019-10-17 | Stop reason: HOSPADM

## 2019-10-17 RX ORDER — HYDROCODONE BITARTRATE AND ACETAMINOPHEN 7.5; 325 MG/1; MG/1
1 TABLET ORAL ONCE AS NEEDED
Status: DISCONTINUED | OUTPATIENT
Start: 2019-10-17 | End: 2019-10-17 | Stop reason: HOSPADM

## 2019-10-17 RX ORDER — MIDAZOLAM HYDROCHLORIDE 1 MG/ML
1 INJECTION INTRAMUSCULAR; INTRAVENOUS
Status: DISCONTINUED | OUTPATIENT
Start: 2019-10-17 | End: 2019-10-17 | Stop reason: HOSPADM

## 2019-10-17 RX ORDER — MIDAZOLAM HYDROCHLORIDE 1 MG/ML
2 INJECTION INTRAMUSCULAR; INTRAVENOUS
Status: DISCONTINUED | OUTPATIENT
Start: 2019-10-17 | End: 2019-10-17 | Stop reason: HOSPADM

## 2019-10-17 RX ORDER — OXYBUTYNIN CHLORIDE 5 MG/1
5 TABLET ORAL 3 TIMES DAILY
Qty: 40 TABLET | Refills: 0 | Status: SHIPPED | OUTPATIENT
Start: 2019-10-17 | End: 2019-12-09

## 2019-10-17 RX ORDER — MAGNESIUM HYDROXIDE 1200 MG/15ML
LIQUID ORAL AS NEEDED
Status: DISCONTINUED | OUTPATIENT
Start: 2019-10-17 | End: 2019-10-17 | Stop reason: HOSPADM

## 2019-10-17 RX ORDER — FENTANYL CITRATE 50 UG/ML
25 INJECTION, SOLUTION INTRAMUSCULAR; INTRAVENOUS AS NEEDED
Status: DISCONTINUED | OUTPATIENT
Start: 2019-10-17 | End: 2019-10-17 | Stop reason: HOSPADM

## 2019-10-17 RX ORDER — PROPOFOL 10 MG/ML
VIAL (ML) INTRAVENOUS AS NEEDED
Status: DISCONTINUED | OUTPATIENT
Start: 2019-10-17 | End: 2019-10-17 | Stop reason: SURG

## 2019-10-17 RX ADMIN — KETOROLAC TROMETHAMINE 30 MG: 30 INJECTION, SOLUTION INTRAMUSCULAR at 15:08

## 2019-10-17 RX ADMIN — ONDANSETRON HYDROCHLORIDE 4 MG: 2 SOLUTION INTRAMUSCULAR; INTRAVENOUS at 15:06

## 2019-10-17 RX ADMIN — DEXAMETHASONE SODIUM PHOSPHATE 4 MG: 4 INJECTION, SOLUTION INTRAMUSCULAR; INTRAVENOUS at 15:08

## 2019-10-17 RX ADMIN — PROPOFOL 200 MG: 10 INJECTION, EMULSION INTRAVENOUS at 14:51

## 2019-10-17 RX ADMIN — LIDOCAINE HYDROCHLORIDE 100 MG: 20 INJECTION, SOLUTION INTRAVENOUS at 14:51

## 2019-10-17 RX ADMIN — MIDAZOLAM 2 MG: 1 INJECTION INTRAMUSCULAR; INTRAVENOUS at 13:59

## 2019-10-17 RX ADMIN — Medication 2 G: at 14:55

## 2019-10-17 RX ADMIN — SODIUM CHLORIDE, POTASSIUM CHLORIDE, SODIUM LACTATE AND CALCIUM CHLORIDE 1000 ML: 600; 310; 30; 20 INJECTION, SOLUTION INTRAVENOUS at 11:17

## 2019-10-17 RX ADMIN — FENTANYL CITRATE 100 MCG: 50 INJECTION, SOLUTION INTRAMUSCULAR; INTRAVENOUS at 14:51

## 2019-10-17 NOTE — BRIEF OP NOTE
URETEROSCOPY LASER LITHOTRIPSY WITH STENT INSERTION  Progress Note    Earl Lane  10/17/2019    Pre-op Diagnosis:   Right ureteral stone [N20.1]       Post-Op Diagnosis Codes:     * Right ureteral stone [N20.1]    Procedure/CPT® Codes:      Procedure(s):  CYSTOSCOPY RIGHT RETROGRADE PYELOGRAM    ,Surgeon(s):  Godwin George MD    Anesthesia: General    Staff:   Circulator: Filipe Bacon RN  Scrub Person: Rosetta Velasco; Stephanie Whyte    Findings: Retrograde pyelogram read: A 5 Stateless open-tipped catheter was placed over previously placed 0.38 sensor tip wire.20  mL dilute contrast were used to outline the collecting system. Patient had mild hydroureteronephrosis with dilation of ureter.   There was mild  dilation of the renal pelvis and mild  blunting of the calyces. There was no extravasation of contrast. There were not filling defects noted.               Godwin George MD     Date: 10/17/2019  Time: 3:28 PM

## 2019-10-17 NOTE — DISCHARGE INSTRUCTIONS

## 2019-10-17 NOTE — ANESTHESIA PREPROCEDURE EVALUATION
Anesthesia Evaluation     Patient summary reviewed   NPO Solid Status: > 8 hours             Airway   Mallampati: I  TM distance: >3 FB  Neck ROM: full  Dental      Pulmonary    (-) COPD, asthma, sleep apnea, not a smoker  Cardiovascular   Exercise tolerance: excellent (>7 METS)    (-) pacemaker, past MI, angina, cardiac stents      Neuro/Psych  (-) seizures, TIA, CVA  GI/Hepatic/Renal/Endo    (+)  GERD,    (-) liver disease, no renal disease, diabetes    Musculoskeletal     Abdominal    Substance History      OB/GYN          Other                        Anesthesia Plan    ASA 1     general     intravenous induction   Anesthetic plan, all risks, benefits, and alternatives have been provided, discussed and informed consent has been obtained with: patient.

## 2019-10-17 NOTE — ANESTHESIA PROCEDURE NOTES
Airway  Urgency: elective    Date/Time: 10/17/2019 2:53 PM  Airway not difficult    General Information and Staff    Patient location during procedure: OR  CRNA: Jann Lopez CRNA    Indications and Patient Condition  Indications for airway management: airway protection    Preoxygenated: yes  Mask difficulty assessment: 0 - not attempted    Final Airway Details  Final airway type: supraglottic airway      Successful airway: classic and I-gel  Size 5  Airway Seal Pressure (cm H2O): 18    Number of attempts at approach: 1  Assessment: lips, teeth, and gum same as pre-op

## 2019-10-17 NOTE — OP NOTE
URETEROSCOPY LASER LITHOTRIPSY WITH STENT INSERTION  Procedure Note    Earl Lane  10/17/2019    Pre-op Diagnosis:   Right ureteral stone [N20.1]    Post-op Diagnosis:     Post-Op Diagnosis Codes:     * Right ureteral stone [N20.1]    Procedure/CPT® Codes:      Procedure(s):  CYSTOSCOPY, URETEROSCOPY LASER LITHOTRIPSY, STONE RETRIEVAL, WITH STENT INSERTION, RIGHT and RETROGRADE PYELOGRAM    Surgeon(s):  Godwin George MD    Anesthesia: General    Staff:   Circulator: Filipe Bacon RN  Scrub Person: Rosetta Velasco; Stephanie Whyte    Estimated Blood Loss: none    Specimens:                Specimens     ID Source Type Tests Collected By Collected At Frozen?      A Ureter, Right Tissue · TISSUE PATHOLOGY EXAM   Godwin George MD 10/17/19 1503              Drains:      Findings: 6MM distal right ureteral stone   No ureteral injury  No stone reamining    Complications: None    Indications:  24-year-old with 6  mm  right  ureteral stone. Patient had intractable pain and has  attempted to pass the stone for several days. Options were discussed and she opted for ureteroscopic management all risks benefits and alternatives were discussed.  Patient gave informed consent.       Description of procedure: Patient was brought to the operating room after informed consent was obtained. Patient was placed in the supine position. General endotracheal anesthesia was administered. Patient was then placed in the dorsal lithotomy position. Patient was prepped and draped in the usual sterile fashion. Timeout was done to ensure the correct patient, procedure, and site. Patient received preoperative antibiotics in the holding room.     22 [place] Storz endoscope was inserted urethra in retrograde fashion. The bladder was drained. The bladder was inspected with 30 and 70° lens and there was no lesions noted. The right ureteral orifice was identified there was moderate amount of edema at the orifice. A 0.038 sensor tip  wire was used to cannulate the right ureteral orifice up into the collecting system under fluoroscopic guidance. I then placed the rigid ureteroscopeup to the level of the stone. I used a 365 µ fiber on settings 1 J and 10 Hz dust the stone. A 1.9 Cymraes 0 tip nitinol basket was used to remove the fragments the largest fragment was sent off for pathologic analysis. I then inspected the ureter more proximally and I did not see any evidence of stone. I then removed the rigid ureteroscope backloaded the cystoscope over the wire. Placed a 5 Cymraes open tip catheter into the distal ureter and removed the wire. I instilled 20 mL dilute contrast outlining the collecting system. Findings will be dictated at the conclusion of this note. I then placed a wire up into the collecting system. I removed the open-tipped catheter placed a 6 24 Percuflex stent with a good curl seen in the renal pelvis and good curl seen distally in the bladder. The  string was left on the stent and secured to the patient.  The bladder was drained scope was removed. The patient was awakened from anesthesia and transferred to the recovery room in satisfactory condition.       Godwin George MD     Date: 10/17/2019  Time: 3:25 PM

## 2019-10-17 NOTE — ANESTHESIA POSTPROCEDURE EVALUATION
Patient: Earl Lane    Procedure Summary     Date:  10/17/19 Room / Location:   [facility] OR  /  [facility]    Anesthesia Start:  1447 Anesthesia Stop:  1523    Procedure:  CYSTOSCOPY, URETEROSCOPY LASER LITHOTRIPSY, STONE RETRIEVAL, WITH STENT INSERTION, RIGHT (Right Ureter) Diagnosis:       Right ureteral stone      (Right ureteral stone [N20.1])    Surgeon:  Godwin George MD Provider:  Jann Lopez CRNA    Anesthesia Type:  general ASA Status:  1          Anesthesia Type: general  Last vitals  BP   137/67 (10/17/19 1550)   Temp   97.8 °F (36.6 °C) (10/17/19 1545)   Pulse   58 (10/17/19 1550)   Resp   18 (10/17/19 1550)     SpO2   100 % (10/17/19 1550)     Post Anesthesia Care and Evaluation    Patient location during evaluation: PACU  Patient participation: complete - patient participated  Level of consciousness: awake and alert  Pain management: adequate  Airway patency: patent  Anesthetic complications: No anesthetic complications    Cardiovascular status: acceptable  Respiratory status: acceptable  Hydration status: acceptable    Comments: Blood pressure 137/67, pulse 58, temperature 97.8 °F (36.6 °C), temperature source Temporal, resp. rate 18, SpO2 100 %.    Pt discharged from PACU based on saige score >8

## 2019-10-21 ENCOUNTER — PROCEDURE VISIT (OUTPATIENT)
Dept: UROLOGY | Facility: CLINIC | Age: 24
End: 2019-10-21

## 2019-10-21 DIAGNOSIS — N20.1 RIGHT URETERAL STONE: Primary | ICD-10-CM

## 2019-10-21 PROCEDURE — 99211 OFF/OP EST MAY X REQ PHY/QHP: CPT | Performed by: UROLOGY

## 2019-10-21 NOTE — PROGRESS NOTES
Patient of Dr. George states he is here today to get his stent removed SP Ureteroscopy Laser Litho with stent placement on 10/17/2019. Patient denies any fever, chills, N&V or hematuria. The tape was removed and using the string stent was pulled with no complications. The patient was advised to continue any medications prescribed in the hospital and to call if he has any questions or concerns. The patient verbalized understanding. Follow up with Dr. George in 6 weeks with Renal US prior. Dr. Gutierrez was in the office for this procedure.     Reviewed and agree with above.

## 2019-10-30 LAB
LAB AP CASE REPORT: NORMAL
PATH REPORT.FINAL DX SPEC: NORMAL
PATH REPORT.GROSS SPEC: NORMAL

## 2019-12-06 NOTE — PROGRESS NOTES
Subjective    Mr. Lane is 24 y.o. male    Chief Complaint: Right ureteral stone     History of Present Illness     Recurrent Nephrolithiasis  Patient is her for recurrent nephrolithiasis.  Location of stone is right distal ureter. Stones were found for workup of abdominal pain.  Pt is currently Asymptomatic.  Pt. Has had stone disease for severalyear(s). Risk factors for stone disease include none. Previous management of stone disease was Ureteroscopy.  Stone analysis was Calcium Oxalate Monohydrate.  Metabolic workup revealed pending.  Current treatment regimen includes hydration.  Associated symptoms include none currently.          The following portions of the patient's history were reviewed and updated as appropriate: allergies, current medications, past family history, past medical history, past social history, past surgical history and problem list.    Review of Systems   Constitutional: Negative for chills and fever.   Gastrointestinal: Negative for abdominal pain, anal bleeding and blood in stool.   Genitourinary: Negative for decreased urine volume, difficulty urinating, discharge, dysuria, enuresis, flank pain, frequency, genital sores, hematuria, penile pain, penile swelling, scrotal swelling, testicular pain and urgency.         Current Outpatient Medications:   •  ondansetron (ZOFRAN) 4 MG tablet, Take 1 tablet by mouth Every 8 (Eight) Hours As Needed for Nausea., Disp: 12 tablet, Rfl: 0  •  raNITIdine HCl (ZANTAC PO), Take 50 mg by mouth Daily As Needed (reflux)., Disp: , Rfl:     Past Medical History:   Diagnosis Date   • Epistaxis    • High cholesterol    • Kidney stone    • URI (upper respiratory infection)        Past Surgical History:   Procedure Laterality Date   • URETEROSCOPY LASER LITHOTRIPSY WITH STENT INSERTION Right 10/17/2019    Procedure: CYSTOSCOPY, URETEROSCOPY LASER LITHOTRIPSY, STONE RETRIEVAL, WITH STENT INSERTION, RIGHT;  Surgeon: Godwin George MD;  Location: [facility] OR;   "Service: Urology       Social History     Socioeconomic History   • Marital status:      Spouse name: Not on file   • Number of children: Not on file   • Years of education: Not on file   • Highest education level: Not on file   Tobacco Use   • Smoking status: Never Smoker   • Smokeless tobacco: Never Used   Substance and Sexual Activity   • Alcohol use: Yes     Comment: Socially   • Drug use: No   • Sexual activity: Defer       Family History   Problem Relation Age of Onset   • No Known Problems Father    • No Known Problems Mother        Objective    Temp 98.3 °F (36.8 °C)   Ht 175.6 cm (69.13\")   Wt 88.5 kg (195 lb)   BMI 28.68 kg/m²     Physical Exam   Constitutional: He is oriented to person, place, and time. He appears well-developed and well-nourished. No distress.   Pulmonary/Chest: Effort normal.   Abdominal: Soft. He exhibits no distension and no mass. There is no tenderness. There is no rebound and no guarding. No hernia.   Neurological: He is alert and oriented to person, place, and time.   Skin: Skin is warm and dry. He is not diaphoretic.   Psychiatric: He has a normal mood and affect.   Vitals reviewed.      Renal ultrasound independent review    The renal ultrasound is available for me to review.  Treatment recommendations require an independent review.  This film has been reviewed by the radiologist to determine any non urologic abnormalities that are presents.  However, I very closely inspected the kidneys for size, symmetry, contour, parenchymal thickness, perinephric reaction, presence of calcifications, and intrarenal dilation of the collecting system.       The right kidney appears normal on this ultrasound.  The renal parenchymal is normal in thickness.  There are no solid masses or cysts.  There is no hydronephrosis.  There are no stones.      The left kidney appears normal on this ultrasound.  The renal parenchymal is normal in thickness.  There are no solid masses or cysts.  There " is no hydronephrosis.  There are no stones.      The bladder appears normal on thisultrsaound.  The bladder appears normal in thickness.  There no masses or stones seen on this exam.           Results for orders placed or performed in visit on 12/09/19   POC Urinalysis Dipstick, Multipro   Result Value Ref Range    Color Yellow Yellow, Straw, Dark Yellow, Cecelia    Clarity, UA Clear Clear    Glucose, UA Negative Negative, 1000 mg/dL (3+) mg/dL    Bilirubin Negative Negative    Ketones, UA Negative Negative    Specific Gravity  1.020 1.005 - 1.030    Blood, UA Negative Negative    pH, Urine 7.0 5.0 - 8.0    Protein, POC Trace (A) Negative mg/dL    Urobilinogen, UA Normal Normal    Nitrite, UA Negative Negative    Leukocytes Negative Negative   Patient's Body mass index is 28.68 kg/m². BMI is above normal parameters. Recommendations include: educational material.    Assessment and Plan    Diagnoses and all orders for this visit:    Nephrolithiasis  -     POC Urinalysis Dipstick, Multipro        Former patient of Dr. Quinn.  He has a longstanding history of stone disease.  He is undergone ureteroscopic management which revealed calcium oxalate monohydrate.  His last intervention was done in October 2019.  He has had serum studies in 2018 which were negative.  He has never been evaluated with a 24 urine.    Follow-up renal ultrasound today negative for hydronephrosis I personally reviewed these images.  We will proceed with a 24-hour urine.    Follow-up in 6 weeks with nurse practitioner.

## 2019-12-09 ENCOUNTER — HOSPITAL ENCOUNTER (OUTPATIENT)
Dept: ULTRASOUND IMAGING | Facility: HOSPITAL | Age: 24
Discharge: HOME | End: 2019-12-09
Admitting: UROLOGY

## 2019-12-09 ENCOUNTER — OFFICE VISIT (OUTPATIENT)
Dept: UROLOGY | Facility: CLINIC | Age: 24
End: 2019-12-09

## 2019-12-09 VITALS — BODY MASS INDEX: 28.88 KG/M2 | HEIGHT: 69 IN | WEIGHT: 195 LBS | TEMPERATURE: 98.3 F

## 2019-12-09 DIAGNOSIS — N20.1 RIGHT URETERAL STONE: ICD-10-CM

## 2019-12-09 DIAGNOSIS — N20.0 NEPHROLITHIASIS: Primary | ICD-10-CM

## 2019-12-09 LAB
BILIRUB BLD-MCNC: NEGATIVE MG/DL
CLARITY, POC: CLEAR
COLOR UR: YELLOW
GLUCOSE UR STRIP-MCNC: NEGATIVE MG/DL
KETONES UR QL: NEGATIVE
LEUKOCYTE EST, POC: NEGATIVE
NITRITE UR-MCNC: NEGATIVE MG/ML
PH UR: 7 [PH] (ref 5–8)
PROT UR STRIP-MCNC: ABNORMAL MG/DL
RBC # UR STRIP: NEGATIVE /UL
SP GR UR: 1.02 (ref 1–1.03)
UROBILINOGEN UR QL: NORMAL

## 2019-12-09 PROCEDURE — 76775 US EXAM ABDO BACK WALL LIM: CPT

## 2019-12-09 PROCEDURE — 81001 URINALYSIS AUTO W/SCOPE: CPT | Performed by: UROLOGY

## 2019-12-09 PROCEDURE — 99213 OFFICE O/P EST LOW 20 MIN: CPT | Performed by: UROLOGY

## 2019-12-09 NOTE — PATIENT INSTRUCTIONS

## 2020-01-20 ENCOUNTER — RESULTS ENCOUNTER (OUTPATIENT)
Dept: UROLOGY | Facility: CLINIC | Age: 25
End: 2020-01-20

## 2020-01-20 DIAGNOSIS — N20.0 NEPHROLITHIASIS: ICD-10-CM

## 2020-08-25 LAB — HOLD SPECIMEN: NORMAL

## 2020-10-23 ENCOUNTER — TELEPHONE (OUTPATIENT)
Dept: UROLOGY | Facility: CLINIC | Age: 25
End: 2020-10-23

## 2020-10-23 NOTE — TELEPHONE ENCOUNTER
I spoke to the patient's wife and patient believes he has a kidney stone. He is experiencing flank pain and some blood in his urine. We have scheduled him to see Dr. George on 10/27 with a KUB prior.

## 2020-10-26 DIAGNOSIS — N20.1 RIGHT URETERAL STONE: Primary | ICD-10-CM

## 2020-10-26 NOTE — PROGRESS NOTES
Subjective    Mr. Lane is 25 y.o. male    Chief Complaint: Recurrent Nephrolithiasis.     History of Present Illness  Recurrent Nephrolithiasis  Patient is her for recurrent nephrolithiasis.  Location of stone is left distal ureter. Stones were found for workup of abdominal pain.  Pt is currently symptomatic.  Pt. Has had stone disease for severalyear(s). Risk factors for stone disease include none. Previous management of stone disease was Ureteroscopy.  Stone analysis was Calcium Oxalate Monohydrate.  Metabolic workup revealed pending.  Current treatment regimen includes hydration.  Associated symptoms include left flank pain, hematuria, no nausea/vomiting.     The following portions of the patient's history were reviewed and updated as appropriate: allergies, current medications, past family history, past medical history, past social history, past surgical history and problem list.    Review of Systems   Constitutional: Negative for appetite change and fever.   HENT: Negative for hearing loss and sore throat.    Eyes: Negative for pain and redness.   Respiratory: Negative for cough and shortness of breath.    Cardiovascular: Negative for chest pain and leg swelling.   Gastrointestinal: Negative for anal bleeding, nausea and vomiting.   Endocrine: Negative for cold intolerance and heat intolerance.   Genitourinary: Positive for flank pain (left). Negative for dysuria, frequency, hematuria and urgency.   Musculoskeletal: Negative for joint swelling and myalgias.   Skin: Negative for color change and rash.   Allergic/Immunologic: Negative for immunocompromised state.   Neurological: Negative for dizziness and speech difficulty.   Hematological: Negative for adenopathy. Does not bruise/bleed easily.   Psychiatric/Behavioral: Negative for dysphoric mood and suicidal ideas.         Current Outpatient Medications:   •  ondansetron (ZOFRAN) 4 MG tablet, Take 1 tablet by mouth Every 8 (Eight) Hours As Needed for Nausea.,  "Disp: 12 tablet, Rfl: 0  •  raNITIdine HCl (ZANTAC PO), Take 50 mg by mouth Daily As Needed (reflux)., Disp: , Rfl:     Past Medical History:   Diagnosis Date   • Epistaxis    • High cholesterol    • Kidney stone    • URI (upper respiratory infection)        Past Surgical History:   Procedure Laterality Date   • URETEROSCOPY LASER LITHOTRIPSY WITH STENT INSERTION Right 10/17/2019    Procedure: CYSTOSCOPY, URETEROSCOPY LASER LITHOTRIPSY, STONE RETRIEVAL, WITH STENT INSERTION, RIGHT;  Surgeon: Godwin George MD;  Location: [facility];  Service: Urology       Social History     Socioeconomic History   • Marital status:      Spouse name: Not on file   • Number of children: Not on file   • Years of education: Not on file   • Highest education level: Not on file   Tobacco Use   • Smoking status: Never Smoker   • Smokeless tobacco: Never Used   Substance and Sexual Activity   • Alcohol use: Yes     Comment: Socially   • Drug use: No   • Sexual activity: Defer       Family History   Problem Relation Age of Onset   • No Known Problems Father    • No Known Problems Mother        Objective    Temp 98.1 °F (36.7 °C) (Temporal)   Ht 177.8 cm (70\")   Wt 90.8 kg (200 lb 3.2 oz)   BMI 28.73 kg/m²     Physical Exam  Vitals signs reviewed.   Constitutional:       General: He is not in acute distress.     Appearance: He is well-developed. He is not diaphoretic.   Pulmonary:      Effort: Pulmonary effort is normal.   Abdominal:      General: There is no distension.      Palpations: Abdomen is soft. There is no mass.      Tenderness: There is no abdominal tenderness. There is no guarding or rebound.      Hernia: No hernia is present.   Skin:     General: Skin is warm and dry.   Neurological:      Mental Status: He is alert and oriented to person, place, and time.       KUB independent review    A KUB is available for me to review today.  The image is inspected for a bowel gas pattern and the general bone structure of " the spine and pelvis. The kidneys are then inspected closely.  Renal outline is noted if identifiable. The kidney, collecting system, and anticipated path of the ureter are examined for calcifications including those in the true pelvis.  This film reveals:    On the right there are no calcificaitons seen in the kidney or the expected course of the ureter. .    On the left there is a single distal ureteral stone measuring 4 mm.          Results for orders placed or performed in visit on 10/27/20   POC Urinalysis Dipstick, Multipro    Specimen: Urine   Result Value Ref Range    Color Yellow Yellow, Straw, Dark Yellow, Cecelia    Clarity, UA Clear Clear    Glucose, UA Negative Negative, 1000 mg/dL (3+) mg/dL    Bilirubin Negative Negative    Ketones, UA Negative Negative    Specific Gravity  1.020 1.005 - 1.030    Blood, UA Negative Negative    pH, Urine 7.0 5.0 - 8.0    Protein, POC Negative Negative mg/dL    Urobilinogen, UA Normal Normal    Nitrite, UA Negative Negative    Leukocytes Negative Negative     Assessment and Plan    Diagnoses and all orders for this visit:    1. Left ureteral stone (Primary)  -     POC Urinalysis Dipstick, Multipro  -     XR Abdomen KUB; Future    Former patient of Dr. Quinn.  He has a longstanding history of stone disease.  He is undergone ureteroscopic management which revealed calcium oxalate monohydrate.  His last intervention was done in October 2019.  He has had serum studies in 2018 which were negative.  He has never been evaluated with a 24 urine.         KUB shows 4 mm distal left ureteral stone.  Options were discussed including medical expulsive therapy versus ureteroscopic management or ESWL.  The patient would like to try medical expulsive therapy.  He will follow-up with me in 3 weeks with repeat KUB.

## 2020-10-27 ENCOUNTER — HOSPITAL ENCOUNTER (OUTPATIENT)
Dept: GENERAL RADIOLOGY | Facility: HOSPITAL | Age: 25
Discharge: HOME | End: 2020-10-27
Admitting: UROLOGY

## 2020-10-27 ENCOUNTER — OFFICE VISIT (OUTPATIENT)
Dept: UROLOGY | Facility: CLINIC | Age: 25
End: 2020-10-27

## 2020-10-27 VITALS — WEIGHT: 200.2 LBS | HEIGHT: 70 IN | BODY MASS INDEX: 28.66 KG/M2 | TEMPERATURE: 98.1 F

## 2020-10-27 DIAGNOSIS — N20.1 LEFT URETERAL STONE: Primary | ICD-10-CM

## 2020-10-27 LAB
BILIRUB BLD-MCNC: NEGATIVE MG/DL
CLARITY, POC: CLEAR
COLOR UR: YELLOW
GLUCOSE UR STRIP-MCNC: NEGATIVE MG/DL
KETONES UR QL: NEGATIVE
LEUKOCYTE EST, POC: NEGATIVE
NITRITE UR-MCNC: NEGATIVE MG/ML
PH UR: 7 [PH] (ref 5–8)
PROT UR STRIP-MCNC: NEGATIVE MG/DL
RBC # UR STRIP: NEGATIVE /UL
SP GR UR: 1.02 (ref 1–1.03)
UROBILINOGEN UR QL: NORMAL

## 2020-10-27 PROCEDURE — 74018 RADEX ABDOMEN 1 VIEW: CPT

## 2020-10-27 PROCEDURE — 99213 OFFICE O/P EST LOW 20 MIN: CPT | Performed by: UROLOGY

## 2020-10-27 PROCEDURE — 81001 URINALYSIS AUTO W/SCOPE: CPT | Performed by: UROLOGY

## 2022-12-05 NOTE — PROGRESS NOTES
Subjective    Mr. Lane is 27 y.o. male    Chief Complaint: Vasectomy Consult    History of Present Illness  The patient has been pondering the option of a vasectomy for2 months. Anatomically this is a lower genital tract issue/procedure. With regard to context of the decision, he presently has 2 children. He is . Associated/Relevant symptoms/signs include None. He voices no additional questions about birth control options.     The following portions of the patient's history were reviewed and updated as appropriate: allergies, current medications, past family history, past medical history, past social history, past surgical history and problem list.    Review of Systems   Constitutional: Negative for chills and fever.   Gastrointestinal: Negative for abdominal pain, anal bleeding and blood in stool.   Genitourinary: Negative for dysuria and hematuria.         Current Outpatient Medications:   •  raNITIdine HCl (ZANTAC PO), Take 50 mg by mouth Daily As Needed (reflux)., Disp: , Rfl:   •  ALPRAZolam (Xanax) 2 MG tablet, Take 1 tablet by mouth At Night As Needed for Anxiety. Take 30 minutes prior to procedure, Disp: 1 tablet, Rfl: 0  •  HYDROcodone-acetaminophen (Norco) 5-325 MG per tablet, Take 1 tablet by mouth Every 6 (Six) Hours As Needed for Severe Pain., Disp: 12 tablet, Rfl: 0  •  ondansetron (Zofran) 4 MG tablet, Take 1 tablet by mouth Daily As Needed for Nausea or Vomiting., Disp: 20 tablet, Rfl: 1    Past Medical History:   Diagnosis Date   • Epistaxis    • High cholesterol    • Kidney stone    • URI (upper respiratory infection)        Past Surgical History:   Procedure Laterality Date   • URETEROSCOPY LASER LITHOTRIPSY WITH STENT INSERTION Right 10/17/2019    Procedure: CYSTOSCOPY, URETEROSCOPY LASER LITHOTRIPSY, STONE RETRIEVAL, WITH STENT INSERTION, RIGHT;  Surgeon: Godwin George MD;  Location: [facility];  Service: Urology       Social History     Socioeconomic History   • Marital status:     Tobacco Use   • Smoking status: Never   • Smokeless tobacco: Never   Substance and Sexual Activity   • Alcohol use: Yes     Comment: Socially   • Drug use: No   • Sexual activity: Defer       Family History   Problem Relation Age of Onset   • No Known Problems Father    • No Known Problems Mother        Objective    There were no vitals taken for this visit.    Physical Exam  Genitourinary:     Penis: Normal.       Testes: Normal.      Comments: Vas palpable            Results for orders placed or performed in visit on 10/27/20   POC Urinalysis Dipstick, Multipro    Specimen: Urine   Result Value Ref Range    Color Yellow Yellow, Straw, Dark Yellow, Cecelia    Clarity, UA Clear Clear    Glucose, UA Negative Negative, 1000 mg/dL (3+) mg/dL    Bilirubin Negative Negative    Ketones, UA Negative Negative    Specific Gravity  1.020 1.005 - 1.030    Blood, UA Negative Negative    pH, Urine 7.0 5.0 - 8.0    Protein, POC Negative Negative mg/dL    Urobilinogen, UA Normal Normal    Nitrite, UA Negative Negative    Leukocytes Negative Negative     Assessment and Plan    Diagnoses and all orders for this visit:    1. Encounter for vasectomy counseling (Primary)  -     Vasectomy; Future  -     ALPRAZolam (Xanax) 2 MG tablet; Take 1 tablet by mouth At Night As Needed for Anxiety. Take 30 minutes prior to procedure  Dispense: 1 tablet; Refill: 0  -     HYDROcodone-acetaminophen (Norco) 5-325 MG per tablet; Take 1 tablet by mouth Every 6 (Six) Hours As Needed for Severe Pain.  Dispense: 12 tablet; Refill: 0  -     ondansetron (Zofran) 4 MG tablet; Take 1 tablet by mouth Daily As Needed for Nausea or Vomiting.  Dispense: 20 tablet; Refill: 1          He was given the consent form, pre-vasectomy instruction sheet, and vasectomy booklet. I extensively reviewed with him the likely postoperative recuperative period as well as the need to continue to use contraception until he is notified by us of his sterility. He will have a  semen analysis after 20-30 ejaculations. He understands the potential side effects of local anesthesia, bleeding, scrotal hematoma, wound infection, epididymal orchitis, epididymal congestion,  1% risk chronic testicular pain potentially requiring further surgery, sperm granuloma, antisperm antibodies, early recanalization, spontaneous recanalization with pregnancy after demonstration of azoospermia risk of 1 in 2000 and the possible association with prostate cancer. He is aware of alternatives to vasectomy. He has given this careful consideration and wishes to proceed with a vasectomy.

## 2022-12-07 ENCOUNTER — OFFICE VISIT (OUTPATIENT)
Dept: UROLOGY | Facility: CLINIC | Age: 27
End: 2022-12-07

## 2022-12-07 DIAGNOSIS — Z30.09 ENCOUNTER FOR VASECTOMY COUNSELING: Primary | ICD-10-CM

## 2022-12-07 PROCEDURE — 99213 OFFICE O/P EST LOW 20 MIN: CPT | Performed by: UROLOGY

## 2022-12-07 RX ORDER — ONDANSETRON 4 MG/1
4 TABLET, FILM COATED ORAL DAILY PRN
Qty: 20 TABLET | Refills: 1 | Status: SHIPPED | OUTPATIENT
Start: 2022-12-07

## 2022-12-07 RX ORDER — ALPRAZOLAM 2 MG/1
2 TABLET ORAL NIGHTLY PRN
Qty: 1 TABLET | Refills: 0 | Status: SHIPPED | OUTPATIENT
Start: 2022-12-07

## 2022-12-07 RX ORDER — HYDROCODONE BITARTRATE AND ACETAMINOPHEN 5; 325 MG/1; MG/1
1 TABLET ORAL EVERY 6 HOURS PRN
Qty: 12 TABLET | Refills: 0 | Status: SHIPPED | OUTPATIENT
Start: 2022-12-07

## 2023-01-25 ENCOUNTER — TELEPHONE (OUTPATIENT)
Dept: UROLOGY | Facility: CLINIC | Age: 28
End: 2023-01-25

## 2023-01-25 NOTE — TELEPHONE ENCOUNTER
Called and spoke to pt wife to let her know that we had to move Vasectomy appt to 2/9/23 at 11am

## 2023-02-09 ENCOUNTER — PROCEDURE VISIT (OUTPATIENT)
Dept: UROLOGY | Facility: CLINIC | Age: 28
End: 2023-02-09

## 2023-02-09 DIAGNOSIS — Z30.2 ENCOUNTER FOR VASECTOMY: ICD-10-CM

## 2023-02-09 PROCEDURE — 55250 REMOVAL OF SPERM DUCT(S): CPT | Performed by: UROLOGY

## 2023-02-09 NOTE — PROGRESS NOTES
No Scalpel Vasectomy Procedure Note    Indications: 27 y.o. male desiring permanent sterilization    Pre-operative Diagnosis: Undesired fertility    Post-operative Diagnosis: Undesired fertility    Anesthesia: Lidocaine 1% without epinephrine     Procedure Details     The risks and benefits of the procedure were discussed at the pre-procedure consultation, and written, informed consent obtained.    Premedicated with Norco 5 and Xanax 2mg 30 minutes prior to procedure.    The scrotum was palpated with both testes normal in size and position, no masses palpated. The scrotum was cleansed with warm Betadine and draped in the usual sterile manner.     A vasal sheath block was performed on both the left and right vas.  After adequate anesthesia was established, a small perforation was made in the skin and the right vas was isolated with the ring forceps, dissected free and delivered through the skin perforation.  The right vas was divided, approximately 3 cm portion removed, and each end of the vas was cauterized.  The ends of the vas were replaced in the scrotum through the puncture site.  The left vas was then isolated, divided, cauterized in a similar fashion.  Midportions removed not sent to pathology to confirm because they were grossly normal.     Any bleeding was controlled with electrocautery.  3.0 Chormic interrupted suture was used to close both sites. The puncture site was dry when the procedure was completed. Dressing was applied to both incisions and jock strap placed for scrotal support.    Specimen: None    Condition: Stable    Complications: None    Plan:  1. Continue contraception until negative sperm analysis. Bring 2 semen samples after 20-30 ejaculates  2. Warning signs of infection were reviewed.   3. Patient is taken home by significant other with written home care instructions.  • Bedrest X 48 hrs, Ice pack every 3 hours for 24 hrs.    • Call the clinic if excessive pain, bleeding or swelling.

## 2023-02-10 ENCOUNTER — TELEPHONE (OUTPATIENT)
Dept: UROLOGY | Facility: CLINIC | Age: 28
End: 2023-02-10

## 2023-02-10 NOTE — TELEPHONE ENCOUNTER
Patient's wife called stating that the pharmacy didn't have his pain medicine he was supposed to have yesterday.  I informed her that back in December we sent zofran, xanax, and norco to his pharmacy.  She said she had called and they didn't have it- but they had previously picked up the xanax.  I called pharmacy to verify- they said they had it.  I called wife back and told her they had it available. She said she will call them back- that the automated system told her that they didn't have it

## 2024-12-08 ENCOUNTER — APPOINTMENT (OUTPATIENT)
Dept: CT IMAGING | Facility: HOSPITAL | Age: 29
End: 2024-12-08
Payer: COMMERCIAL

## 2024-12-08 ENCOUNTER — HOSPITAL ENCOUNTER (EMERGENCY)
Facility: HOSPITAL | Age: 29
Discharge: HOME | End: 2024-12-08
Payer: COMMERCIAL

## 2024-12-08 VITALS
HEIGHT: 69 IN | DIASTOLIC BLOOD PRESSURE: 59 MMHG | SYSTOLIC BLOOD PRESSURE: 119 MMHG | HEART RATE: 67 BPM | TEMPERATURE: 97.9 F | RESPIRATION RATE: 20 BRPM | BODY MASS INDEX: 28.14 KG/M2 | WEIGHT: 190 LBS | OXYGEN SATURATION: 99 %

## 2024-12-08 DIAGNOSIS — N20.0 KIDNEY STONE: Primary | ICD-10-CM

## 2024-12-08 DIAGNOSIS — N39.0 ACUTE UTI: ICD-10-CM

## 2024-12-08 LAB
ALBUMIN SERPL-MCNC: 4.7 G/DL (ref 3.5–5.2)
ALBUMIN/GLOB SERPL: 1.5 G/DL
ALP SERPL-CCNC: 96 U/L (ref 39–117)
ALT SERPL W P-5'-P-CCNC: 44 U/L (ref 1–41)
ANION GAP SERPL CALCULATED.3IONS-SCNC: 10 MMOL/L (ref 5–15)
AST SERPL-CCNC: 24 U/L (ref 1–40)
BACTERIA UR QL AUTO: ABNORMAL /HPF
BASOPHILS # BLD AUTO: 0.04 10*3/MM3 (ref 0–0.2)
BASOPHILS NFR BLD AUTO: 0.4 % (ref 0–1.5)
BILIRUB SERPL-MCNC: 1.1 MG/DL (ref 0–1.2)
BILIRUB UR QL STRIP: ABNORMAL
BUN SERPL-MCNC: 15 MG/DL (ref 6–20)
BUN/CREAT SERPL: 16.7 (ref 7–25)
CALCIUM SPEC-SCNC: 9.3 MG/DL (ref 8.6–10.5)
CHLORIDE SERPL-SCNC: 102 MMOL/L (ref 98–107)
CLARITY UR: ABNORMAL
CO2 SERPL-SCNC: 25 MMOL/L (ref 22–29)
COLOR UR: ABNORMAL
CREAT SERPL-MCNC: 0.9 MG/DL (ref 0.76–1.27)
DEPRECATED RDW RBC AUTO: 38.5 FL (ref 37–54)
EGFRCR SERPLBLD CKD-EPI 2021: 119.3 ML/MIN/1.73
EOSINOPHIL # BLD AUTO: 0.01 10*3/MM3 (ref 0–0.4)
EOSINOPHIL NFR BLD AUTO: 0.1 % (ref 0.3–6.2)
ERYTHROCYTE [DISTWIDTH] IN BLOOD BY AUTOMATED COUNT: 12 % (ref 12.3–15.4)
GLOBULIN UR ELPH-MCNC: 3.1 GM/DL
GLUCOSE SERPL-MCNC: 113 MG/DL (ref 65–99)
GLUCOSE UR STRIP-MCNC: NEGATIVE MG/DL
HCT VFR BLD AUTO: 44.2 % (ref 37.5–51)
HGB BLD-MCNC: 15.6 G/DL (ref 13–17.7)
HGB UR QL STRIP.AUTO: ABNORMAL
HYALINE CASTS UR QL AUTO: ABNORMAL /LPF
IMM GRANULOCYTES # BLD AUTO: 0.03 10*3/MM3 (ref 0–0.05)
IMM GRANULOCYTES NFR BLD AUTO: 0.3 % (ref 0–0.5)
KETONES UR QL STRIP: NEGATIVE
LEUKOCYTE ESTERASE UR QL STRIP.AUTO: ABNORMAL
LYMPHOCYTES # BLD AUTO: 1.34 10*3/MM3 (ref 0.7–3.1)
LYMPHOCYTES NFR BLD AUTO: 12.1 % (ref 19.6–45.3)
MCH RBC QN AUTO: 30.7 PG (ref 26.6–33)
MCHC RBC AUTO-ENTMCNC: 35.3 G/DL (ref 31.5–35.7)
MCV RBC AUTO: 87 FL (ref 79–97)
MONOCYTES # BLD AUTO: 0.55 10*3/MM3 (ref 0.1–0.9)
MONOCYTES NFR BLD AUTO: 5 % (ref 5–12)
NEUTROPHILS NFR BLD AUTO: 82.1 % (ref 42.7–76)
NEUTROPHILS NFR BLD AUTO: 9.14 10*3/MM3 (ref 1.7–7)
NITRITE UR QL STRIP: NEGATIVE
NRBC BLD AUTO-RTO: 0 /100 WBC (ref 0–0.2)
PH UR STRIP.AUTO: 6 [PH] (ref 5–8)
PLATELET # BLD AUTO: 255 10*3/MM3 (ref 140–450)
PMV BLD AUTO: 10.1 FL (ref 6–12)
POTASSIUM SERPL-SCNC: 4.1 MMOL/L (ref 3.5–5.2)
PROT SERPL-MCNC: 7.8 G/DL (ref 6–8.5)
PROT UR QL STRIP: ABNORMAL
RBC # BLD AUTO: 5.08 10*6/MM3 (ref 4.14–5.8)
RBC # UR STRIP: ABNORMAL /HPF
REF LAB TEST METHOD: ABNORMAL
SODIUM SERPL-SCNC: 137 MMOL/L (ref 136–145)
SP GR UR STRIP: 1.03 (ref 1–1.03)
SQUAMOUS #/AREA URNS HPF: ABNORMAL /HPF
UROBILINOGEN UR QL STRIP: ABNORMAL
WBC # UR STRIP: ABNORMAL /HPF
WBC NRBC COR # BLD AUTO: 11.11 10*3/MM3 (ref 3.4–10.8)
YEAST URNS QL MICRO: ABNORMAL /HPF

## 2024-12-08 PROCEDURE — 25010000002 PROCHLORPERAZINE 10 MG/2ML SOLUTION: Performed by: NURSE PRACTITIONER

## 2024-12-08 PROCEDURE — 96375 TX/PRO/DX INJ NEW DRUG ADDON: CPT

## 2024-12-08 PROCEDURE — 99284 EMERGENCY DEPT VISIT MOD MDM: CPT

## 2024-12-08 PROCEDURE — 25010000002 ONDANSETRON PER 1 MG: Performed by: NURSE PRACTITIONER

## 2024-12-08 PROCEDURE — 81001 URINALYSIS AUTO W/SCOPE: CPT | Performed by: NURSE PRACTITIONER

## 2024-12-08 PROCEDURE — 25810000003 LACTATED RINGERS SOLUTION: Performed by: NURSE PRACTITIONER

## 2024-12-08 PROCEDURE — 85025 COMPLETE CBC W/AUTO DIFF WBC: CPT | Performed by: NURSE PRACTITIONER

## 2024-12-08 PROCEDURE — 25010000002 KETOROLAC TROMETHAMINE PER 15 MG: Performed by: NURSE PRACTITIONER

## 2024-12-08 PROCEDURE — 74176 CT ABD & PELVIS W/O CONTRAST: CPT

## 2024-12-08 PROCEDURE — 96374 THER/PROPH/DIAG INJ IV PUSH: CPT

## 2024-12-08 PROCEDURE — 80053 COMPREHEN METABOLIC PANEL: CPT | Performed by: NURSE PRACTITIONER

## 2024-12-08 RX ORDER — HYDROCODONE BITARTRATE AND ACETAMINOPHEN 5; 325 MG/1; MG/1
1 TABLET ORAL EVERY 6 HOURS PRN
Qty: 15 TABLET | Refills: 0 | Status: SHIPPED | OUTPATIENT
Start: 2024-12-08 | End: 2024-12-08

## 2024-12-08 RX ORDER — TAMSULOSIN HYDROCHLORIDE 0.4 MG/1
1 CAPSULE ORAL NIGHTLY
Qty: 30 CAPSULE | Refills: 0 | Status: SHIPPED | OUTPATIENT
Start: 2024-12-08

## 2024-12-08 RX ORDER — CEFDINIR 300 MG/1
300 CAPSULE ORAL 2 TIMES DAILY
Qty: 20 CAPSULE | Refills: 0 | Status: SHIPPED | OUTPATIENT
Start: 2024-12-08 | End: 2024-12-08

## 2024-12-08 RX ORDER — TAMSULOSIN HYDROCHLORIDE 0.4 MG/1
1 CAPSULE ORAL NIGHTLY
Qty: 30 CAPSULE | Refills: 0 | Status: SHIPPED | OUTPATIENT
Start: 2024-12-08 | End: 2024-12-08

## 2024-12-08 RX ORDER — ONDANSETRON 2 MG/ML
4 INJECTION INTRAMUSCULAR; INTRAVENOUS ONCE
Status: COMPLETED | OUTPATIENT
Start: 2024-12-08 | End: 2024-12-08

## 2024-12-08 RX ORDER — HYDROCODONE BITARTRATE AND ACETAMINOPHEN 5; 325 MG/1; MG/1
1 TABLET ORAL EVERY 6 HOURS PRN
Qty: 15 TABLET | Refills: 0 | Status: SHIPPED | OUTPATIENT
Start: 2024-12-08

## 2024-12-08 RX ORDER — CEFDINIR 300 MG/1
300 CAPSULE ORAL 2 TIMES DAILY
Qty: 20 CAPSULE | Refills: 0 | Status: SHIPPED | OUTPATIENT
Start: 2024-12-08 | End: 2024-12-18

## 2024-12-08 RX ORDER — KETOROLAC TROMETHAMINE 10 MG/1
10 TABLET, FILM COATED ORAL EVERY 6 HOURS PRN
Qty: 15 TABLET | Refills: 0 | Status: SHIPPED | OUTPATIENT
Start: 2024-12-08 | End: 2024-12-08

## 2024-12-08 RX ORDER — PROCHLORPERAZINE EDISYLATE 5 MG/ML
10 INJECTION INTRAMUSCULAR; INTRAVENOUS ONCE
Status: COMPLETED | OUTPATIENT
Start: 2024-12-08 | End: 2024-12-08

## 2024-12-08 RX ORDER — ONDANSETRON 4 MG/1
4 TABLET, ORALLY DISINTEGRATING ORAL EVERY 6 HOURS PRN
Qty: 10 TABLET | Refills: 0 | Status: SHIPPED | OUTPATIENT
Start: 2024-12-08 | End: 2024-12-08

## 2024-12-08 RX ORDER — ONDANSETRON 4 MG/1
4 TABLET, ORALLY DISINTEGRATING ORAL EVERY 6 HOURS PRN
Qty: 10 TABLET | Refills: 0 | Status: SHIPPED | OUTPATIENT
Start: 2024-12-08

## 2024-12-08 RX ORDER — KETOROLAC TROMETHAMINE 10 MG/1
10 TABLET, FILM COATED ORAL EVERY 6 HOURS PRN
Qty: 15 TABLET | Refills: 0 | Status: SHIPPED | OUTPATIENT
Start: 2024-12-08

## 2024-12-08 RX ORDER — KETOROLAC TROMETHAMINE 30 MG/ML
30 INJECTION, SOLUTION INTRAMUSCULAR; INTRAVENOUS EVERY 6 HOURS PRN
Status: DISCONTINUED | OUTPATIENT
Start: 2024-12-08 | End: 2024-12-08 | Stop reason: HOSPADM

## 2024-12-08 RX ADMIN — KETOROLAC TROMETHAMINE 30 MG: 30 INJECTION, SOLUTION INTRAMUSCULAR; INTRAVENOUS at 14:55

## 2024-12-08 RX ADMIN — PROCHLORPERAZINE EDISYLATE 10 MG: 5 INJECTION INTRAMUSCULAR; INTRAVENOUS at 14:55

## 2024-12-08 RX ADMIN — ONDANSETRON 4 MG: 2 INJECTION INTRAMUSCULAR; INTRAVENOUS at 13:48

## 2024-12-08 RX ADMIN — SODIUM CHLORIDE, SODIUM LACTATE, POTASSIUM CHLORIDE, CALCIUM CHLORIDE 1000 ML: 20; 30; 600; 310 INJECTION, SOLUTION INTRAVENOUS at 13:47

## 2024-12-08 RX ADMIN — HYDROMORPHONE HYDROCHLORIDE 1 MG: 1 INJECTION, SOLUTION INTRAMUSCULAR; INTRAVENOUS; SUBCUTANEOUS at 13:50

## 2024-12-08 NOTE — ED PROVIDER NOTES
Subjective   History of Present Illness  Patient is a 29-year-old male presents to the emergency department with sudden onset right flank pain and nausea that started around 1030 this morning.  Patient does have a history of kidney stones.  He has had nausea with vomiting as well.  He states his last kidney stone was about 2 years ago.  He states he is having some urinary hesitancy as well.  He states he was feeling fine up to this point.    History provided by:  Patient   used: No        Review of Systems   Constitutional: Negative.    HENT: Negative.     Eyes: Negative.    Respiratory: Negative.     Cardiovascular: Negative.    Gastrointestinal: Negative.    Endocrine: Negative.    Genitourinary:         Patient is a 29-year-old male presents to the emergency department with sudden onset right flank pain and nausea that started around 1030 this morning.  Patient does have a history of kidney stones.  He has had nausea with vomiting as well.  He states his last kidney stone was about 2 years ago.  He states he is having some urinary hesitancy as well.  He states he was feeling fine up to this point.     Musculoskeletal: Negative.    Skin: Negative.    Allergic/Immunologic: Negative.    Neurological: Negative.    Hematological: Negative.    Psychiatric/Behavioral: Negative.     All other systems reviewed and are negative.      Past Medical History:   Diagnosis Date    Epistaxis     High cholesterol     Kidney stone     URI (upper respiratory infection)        No Known Allergies    Past Surgical History:   Procedure Laterality Date    URETEROSCOPY LASER LITHOTRIPSY WITH STENT INSERTION Right 10/17/2019    Procedure: CYSTOSCOPY, URETEROSCOPY LASER LITHOTRIPSY, STONE RETRIEVAL, WITH STENT INSERTION, RIGHT;  Surgeon: Godwin George MD;  Location: [facility];  Service: Urology       Family History   Problem Relation Age of Onset    No Known Problems Father     No Known Problems Mother   "      Social History     Socioeconomic History    Marital status:    Tobacco Use    Smoking status: Never    Smokeless tobacco: Never   Substance and Sexual Activity    Alcohol use: Yes     Comment: Socially    Drug use: No    Sexual activity: Defer       Prior to Admission medications    Medication Sig Start Date End Date Taking? Authorizing Provider   ALPRAZolam (Xanax) 2 MG tablet Take 1 tablet by mouth At Night As Needed for Anxiety. Take 30 minutes prior to procedure 12/7/22   Godwin George MD   HYDROcodone-acetaminophen (Norco) 5-325 MG per tablet Take 1 tablet by mouth Every 6 (Six) Hours As Needed for Severe Pain. 12/7/22   Godwin George MD   ondansetron (Zofran) 4 MG tablet Take 1 tablet by mouth Daily As Needed for Nausea or Vomiting. 12/7/22   Godwin George MD   raNITIdine HCl (ZANTAC PO) Take 50 mg by mouth Daily As Needed (reflux).    Provider, MD Kiki       /59   Pulse 67   Temp 97.9 °F (36.6 °C) (Oral)   Resp 20   Ht 175.3 cm (69\")   Wt 86.2 kg (190 lb)   SpO2 99%   BMI 28.06 kg/m²     Objective   Physical Exam  Vitals and nursing note reviewed.   Constitutional:       Appearance: He is well-developed.      Comments: Appears to be in pain    HENT:      Head: Normocephalic and atraumatic.   Eyes:      Conjunctiva/sclera: Conjunctivae normal.      Pupils: Pupils are equal, round, and reactive to light.   Cardiovascular:      Rate and Rhythm: Normal rate and regular rhythm.      Heart sounds: Normal heart sounds.   Pulmonary:      Effort: Pulmonary effort is normal.      Breath sounds: Normal breath sounds.   Abdominal:      General: Bowel sounds are normal.      Palpations: Abdomen is soft.      Comments: Moderate tenderness right cva on percussion    Musculoskeletal:         General: Normal range of motion.      Cervical back: Normal range of motion and neck supple.   Skin:     General: Skin is warm.      Coloration: Skin is pale.   Neurological:    "   Mental Status: He is alert and oriented to person, place, and time.      Deep Tendon Reflexes: Reflexes are normal and symmetric.   Psychiatric:         Behavior: Behavior normal.         Thought Content: Thought content normal.         Judgment: Judgment normal.         Procedures         Lab Results (last 24 hours)       Procedure Component Value Units Date/Time    CBC & Differential [[id number]]  (Abnormal) Collected: 12/08/24 1347    Specimen: Blood Updated: 12/08/24 1400    Narrative:      The following orders were created for panel order CBC & Differential.  Procedure                               Abnormality         Status                     ---------                               -----------         ------                     CBC Auto Differential[[id number]]        Abnormal            Final result                 Please view results for these tests on the individual orders.    Comprehensive Metabolic Panel [[id number]]  (Abnormal) Collected: 12/08/24 1347    Specimen: Blood Updated: 12/08/24 1418     Glucose 113 mg/dL      BUN 15 mg/dL      Creatinine 0.90 mg/dL      Sodium 137 mmol/L      Potassium 4.1 mmol/L      Chloride 102 mmol/L      CO2 25.0 mmol/L      Calcium 9.3 mg/dL      Total Protein 7.8 g/dL      Albumin 4.7 g/dL      ALT (SGPT) 44 U/L      AST (SGOT) 24 U/L      Alkaline Phosphatase 96 U/L      Total Bilirubin 1.1 mg/dL      Globulin 3.1 gm/dL      A/G Ratio 1.5 g/dL      BUN/Creatinine Ratio 16.7     Anion Gap 10.0 mmol/L      eGFR 119.3 mL/min/1.73     Narrative:      GFR Normal >60  Chronic Kidney Disease <60  Kidney Failure <15      CBC Auto Differential [[id number]]  (Abnormal) Collected: 12/08/24 1347    Specimen: Blood Updated: 12/08/24 1400     WBC 11.11 10*3/mm3      RBC 5.08 10*6/mm3      Hemoglobin 15.6 g/dL      Hematocrit 44.2 %      MCV 87.0 fL      MCH 30.7 pg      MCHC 35.3 g/dL      RDW 12.0 %      RDW-SD 38.5 fl      MPV 10.1 fL      Platelets 255 10*3/mm3      Neutrophil %  82.1 %      Lymphocyte % 12.1 %      Monocyte % 5.0 %      Eosinophil % 0.1 %      Basophil % 0.4 %      Immature Grans % 0.3 %      Neutrophils, Absolute 9.14 10*3/mm3      Lymphocytes, Absolute 1.34 10*3/mm3      Monocytes, Absolute 0.55 10*3/mm3      Eosinophils, Absolute 0.01 10*3/mm3      Basophils, Absolute 0.04 10*3/mm3      Immature Grans, Absolute 0.03 10*3/mm3      nRBC 0.0 /100 WBC     Urinalysis With Culture If Indicated - Urine, Clean Catch [[id number]]  (Abnormal) Collected: 12/08/24 1527    Specimen: Urine, Clean Catch Updated: 12/08/24 1600     Color, UA Dark Yellow     Appearance, UA Cloudy     pH, UA 6.0     Specific Gravity, UA 1.026     Glucose, UA Negative     Ketones, UA Negative     Bilirubin, UA Small (1+)     Blood, UA Large (3+)     Protein,  mg/dL (2+)     Leuk Esterase, UA Small (1+)     Nitrite, UA Negative     Urobilinogen, UA 1.0 E.U./dL    Narrative:      Dipstick results may be inaccurate due to color interference.    Urinalysis, Microscopic Only - Urine, Clean Catch [[id number]]  (Abnormal) Collected: 12/08/24 1527    Specimen: Urine, Clean Catch Updated: 12/08/24 1600     RBC, UA 21-50 /HPF      WBC, UA 3-5 /HPF      Comment: Urine culture not indicated.        Bacteria, UA None Seen /HPF      Squamous Epithelial Cells, UA None Seen /HPF      Yeast, UA Moderate/2+ Budding Yeast /HPF      Hyaline Casts, UA None Seen /LPF      Methodology Manual Light Microscopy            CT Abdomen Pelvis Without Contrast   Final Result       1. A 5 mm stone in the right proximal ureter positioned at the L3 level   creating mild right-sided hydronephrosis. There are 2 nonobstructing   left intrarenal stones, largest measuring 8 mm in the inferior pole of   left kidney. No left ureteral stones or hydronephrosis.   2. Mild hepatic steatosis.       This report was signed and finalized on 12/8/2024 2:08 PM by Dr. Jesenia Collazo MD.              ED Course  ED Course as of 12/08/24 1608   [facility]  08, 2024   1428 Pt is starting to have pain again at this time. Will give toradol at this time  [CW]   1602 IMPRESSION:     1. A 5 mm stone in the right proximal ureter positioned at the L3 level  creating mild right-sided hydronephrosis. There are 2 nonobstructing  left intrarenal stones, largest measuring 8 mm in the inferior pole of  left kidney. No left ureteral stones or hydronephrosis.  2. Mild hepatic steatosis.     This report was signed and finalized on 12/8/2024 2:08 PM by Dr. Jesenia Collazo MD.   [CW]   1602 CMP unremarkable with BUN of 15 creatinine point CBC shows white count of 11.11 hemoglobin 15.6 hematocrit 44.2 urinalysis large amount of blood small amount leukocytes no bacteria CT of the abdomen pelvis without contrast shows a 5 mm stone in the right proximal ureter position to L3 level creating mild right sided hydro 2 nonobstructing left intrarenal stones largest measuring 8 mm in the inferior pole of the left kidney no left ureteral stones or hydronephrosis.  Patient had received Dilaudid and Zofran initially for his pain he continued to have pain he was given Toradol.  He was given a liter of lactated Ringer's he continued to have nausea he was given Compazine.  He states his pain is at a 0 at this time.  He does feel comfortable going home.  Will send the patient home with a small amount of pain medication for acute pain.  Will send home Norco, Toradol, Zofran, and Flomax.  He has seen Dr. George previously.  I will also send home with antibiotics as well due to his small amount leukocytes in the urinalysis and mildly elevated WBC as well.  Advised to follow-up with Dr. George this week.  Advised to return to emergency department if his symptoms worsen.  Maxx report completed there is no signs of suspicious activity noted.  Patient be discharged shortly in stable condition.  Advised to return to emergency department if he had increased pain, fever, vomiting, inability to urinate.  Patient is  in agreement with care plan and voices understanding of instruction.  Patient be discharged shortly in stable condition. [CW]      ED Course User Index  [CW] Nano Weldon APRN        Medical Decision Making  Patient is a 29-year-old male presents to the emergency department with sudden onset right flank pain and nausea that started around 1030 this morning.  Patient does have a history of kidney stones.  He has had nausea with vomiting as well.  He states his last kidney stone was about 2 years ago.  He states he is having some urinary hesitancy as well.  He states he was feeling fine up to this point.  Course of treatment in the er : Patient appears to be in pain.  His vitals are stable with blood pressure 147/94, temp 97.9, heart rate 71, respirations 20, O2 sats 100% on room air.  PERRL extraocular movements are intact.  Lungs clear to auscultation.  CV normal sinus rhythm.  Patient has moderate CVA tenderness on percussion.  Abdomen is soft, nondistended nontender.  Patient is pale and appears to be in pain.  Laboratory studies, CT of the abdomen pelvis without contrast, IV fluids, pain medication antiemetics have been ordered.  Differential diagnosis to include but not limited to: kidney stone; uti; pyelonephritis; appendicitis; colitis; and others   Labs Reviewed  COMPREHENSIVE METABOLIC PANEL - Abnormal; Notable for the following components:     Glucose                       113 (*)                ALT (SGPT)                    44 (*)              All other components within normal limits         Narrative: GFR Normal >60                  Chronic Kidney Disease <60                  Kidney Failure <15                    URINALYSIS W/ CULTURE IF INDICATED - Abnormal; Notable for the following components:     Color, UA                     Dark Yellow (*)               Appearance, UA                Cloudy (*)               Bilirubin, UA                 Small (1+) (*)               Blood, UA                      Large (3+) (*)               Protein, UA                     (*)                  Leuk Esterase, UA             Small (1+) (*)            All other components within normal limits         Narrative: Dipstick results may be inaccurate due to color interference.  CBC WITH AUTO DIFFERENTIAL - Abnormal; Notable for the following components:     WBC                           11.11 (*)               RDW                           12.0 (*)               Neutrophil %                  82.1 (*)               Lymphocyte %                  12.1 (*)               Eosinophil %                  0.1 (*)                Neutrophils, Absolute         9.14 (*)            All other components within normal limits  URINALYSIS, MICROSCOPIC ONLY - Abnormal; Notable for the following components:     RBC, UA                       21-50 (*)               WBC, UA                       3-5 (*)                Yeast, UA                       (*)               All other components within normal limits  CBC AND DIFFERENTIAL  CT Abdomen Pelvis Without Contrast   Final Result         1. A 5 mm stone in the right proximal ureter positioned at the L3 level    creating mild right-sided hydronephrosis. There are 2 nonobstructing    left intrarenal stones, largest measuring 8 mm in the inferior pole of    left kidney. No left ureteral stones or hydronephrosis.    2. Mild hepatic steatosis.         This report was signed and finalized on 12/8/2024 2:08 PM by Dr. Jesenia Collazo MD.          CMP unremarkable with BUN of 15 creatinine point CBC shows white count of 11.11 hemoglobin 15.6 hematocrit 44.2 urinalysis large amount of blood small amount leukocytes no bacteria CT of the abdomen pelvis without contrast shows a 5 mm stone in the right proximal ureter position to L3 level creating mild right sided hydro 2 nonobstructing left intrarenal stones largest measuring 8 mm in the inferior pole of the left kidney no left ureteral stones or  hydronephrosis.  Patient had received Dilaudid and Zofran initially for his pain he continued to have pain he was given Toradol.  He was given a liter of lactated Ringer's he continued to have nausea he was given Compazine.  He states his pain is at a 0 at this time.  He does feel comfortable going home.  Will send the patient home with a small amount of pain medication for acute pain.  Will send home Norco, Toradol, Zofran, and Flomax.  He has seen Dr. George previously.  I will also send home with antibiotics as well due to his small amount leukocytes in the urinalysis and mildly elevated WBC as well.  Advised to follow-up with Dr. George this week.  Advised to return to emergency department if his symptoms worsen.  Maxx report completed there is no signs of suspicious activity noted.  Patient be discharged shortly in stable condition.  Advised to return to emergency department if he had increased pain, fever, vomiting, inability to urinate.  Patient is in agreement with care plan and voices understanding of instruction.  Patient be discharged shortly in stable condition.      Problems Addressed:  Acute UTI: complicated acute illness or injury  Kidney stone: complicated acute illness or injury    Amount and/or Complexity of Data Reviewed  Labs: ordered. Decision-making details documented in ED Course.  Radiology: ordered. Decision-making details documented in ED Course.    Risk  Prescription drug management.         Final diagnoses:   Kidney stone   Acute UTI          Nano Weldon, APRN  12/08/24 [id number]

## 2024-12-08 NOTE — Clinical Note
[facility] EMERGENCY DEPARTMENT  [address] [place] [address] AVE  [facility] [address]  Phone: [phone]    Earl Lane was seen and treated in our emergency department on 12/8/2024.  He may return to work on 12/11/2024.         Thank you for choosing [facility].    Nano Weldon APRN

## 2024-12-08 NOTE — DISCHARGE INSTRUCTIONS
Return to ER if symptoms worsen   Follow up with Dr. George tomorrow  Return to the er if increased pain, fever, vomiting, inability to urinate

## 2024-12-08 NOTE — Clinical Note
[facility] EMERGENCY DEPARTMENT  [address] [place] [address] AVE  [facility] [address]  Phone: [phone]    Earl Lane was seen and treated in our emergency department on 12/8/2024.  He may return to work on 12/11/2024.         Thank you for choosing [facility].    Nano Weldon APRN

## (undated) DEVICE — FIBR LASR FLEXIVA 365 HIPOWR 1P/U

## (undated) DEVICE — NITINOL STONE RETRIEVAL BASKET: Brand: ZERO TIP

## (undated) DEVICE — PK CYSTO 30

## (undated) DEVICE — CATH URETRL OPN/END 5F70CM

## (undated) DEVICE — ENDOSCOPIC SEAL URO 1 SIZE FITS ALL: Brand: ENDOSCOPIC SEAL

## (undated) DEVICE — GLV SURG BIOGEL M LTX PF 7 1/2

## (undated) DEVICE — PK TURNOVER CYSTO RM

## (undated) DEVICE — GW SENSR DUALFLEX NITNL STR .038IN 3X150CM